# Patient Record
Sex: MALE | Race: BLACK OR AFRICAN AMERICAN | NOT HISPANIC OR LATINO | URBAN - METROPOLITAN AREA
[De-identification: names, ages, dates, MRNs, and addresses within clinical notes are randomized per-mention and may not be internally consistent; named-entity substitution may affect disease eponyms.]

---

## 2019-09-25 ENCOUNTER — EMERGENCY (EMERGENCY)
Facility: HOSPITAL | Age: 44
LOS: 1 days | Discharge: HOME | End: 2019-09-25
Attending: STUDENT IN AN ORGANIZED HEALTH CARE EDUCATION/TRAINING PROGRAM
Payer: MEDICAID

## 2019-09-25 VITALS
OXYGEN SATURATION: 97 % | SYSTOLIC BLOOD PRESSURE: 141 MMHG | DIASTOLIC BLOOD PRESSURE: 89 MMHG | HEART RATE: 81 BPM | TEMPERATURE: 98 F | RESPIRATION RATE: 16 BRPM

## 2019-09-25 VITALS
OXYGEN SATURATION: 100 % | DIASTOLIC BLOOD PRESSURE: 99 MMHG | RESPIRATION RATE: 98 BRPM | SYSTOLIC BLOOD PRESSURE: 159 MMHG | HEART RATE: 18 BPM

## 2019-09-25 LAB
ALBUMIN SERPL ELPH-MCNC: 4.5 G/DL — SIGNIFICANT CHANGE UP (ref 3.3–5)
ALP SERPL-CCNC: 114 U/L — SIGNIFICANT CHANGE UP (ref 40–120)
ALT FLD-CCNC: 71 U/L — HIGH (ref 4–41)
ANION GAP SERPL CALC-SCNC: 16 MMO/L — HIGH (ref 7–14)
APPEARANCE UR: CLEAR — SIGNIFICANT CHANGE UP
AST SERPL-CCNC: 41 U/L — HIGH (ref 4–40)
BASOPHILS # BLD AUTO: 0.06 K/UL — SIGNIFICANT CHANGE UP (ref 0–0.2)
BASOPHILS NFR BLD AUTO: 0.7 % — SIGNIFICANT CHANGE UP (ref 0–2)
BILIRUB SERPL-MCNC: 0.5 MG/DL — SIGNIFICANT CHANGE UP (ref 0.2–1.2)
BILIRUB UR-MCNC: NEGATIVE — SIGNIFICANT CHANGE UP
BLOOD UR QL VISUAL: NEGATIVE — SIGNIFICANT CHANGE UP
BUN SERPL-MCNC: 15 MG/DL — SIGNIFICANT CHANGE UP (ref 7–23)
CALCIUM SERPL-MCNC: 9.3 MG/DL — SIGNIFICANT CHANGE UP (ref 8.4–10.5)
CHLORIDE SERPL-SCNC: 93 MMOL/L — LOW (ref 98–107)
CO2 SERPL-SCNC: 25 MMOL/L — SIGNIFICANT CHANGE UP (ref 22–31)
COLOR SPEC: YELLOW — SIGNIFICANT CHANGE UP
CREAT SERPL-MCNC: 1.01 MG/DL — SIGNIFICANT CHANGE UP (ref 0.5–1.3)
EOSINOPHIL # BLD AUTO: 0.09 K/UL — SIGNIFICANT CHANGE UP (ref 0–0.5)
EOSINOPHIL NFR BLD AUTO: 1 % — SIGNIFICANT CHANGE UP (ref 0–6)
GLUCOSE SERPL-MCNC: 395 MG/DL — HIGH (ref 70–99)
GLUCOSE UR-MCNC: >1000 — HIGH
HCT VFR BLD CALC: 52.4 % — HIGH (ref 39–50)
HGB BLD-MCNC: 16.9 G/DL — SIGNIFICANT CHANGE UP (ref 13–17)
IMM GRANULOCYTES NFR BLD AUTO: 0.3 % — SIGNIFICANT CHANGE UP (ref 0–1.5)
KETONES UR-MCNC: SIGNIFICANT CHANGE UP
LACTATE BLDV-MCNC: 1.9 MMOL/L — SIGNIFICANT CHANGE UP (ref 0.5–2)
LEUKOCYTE ESTERASE UR-ACNC: NEGATIVE — SIGNIFICANT CHANGE UP
LYMPHOCYTES # BLD AUTO: 2.63 K/UL — SIGNIFICANT CHANGE UP (ref 1–3.3)
LYMPHOCYTES # BLD AUTO: 29.6 % — SIGNIFICANT CHANGE UP (ref 13–44)
MCHC RBC-ENTMCNC: 26.8 PG — LOW (ref 27–34)
MCHC RBC-ENTMCNC: 32.3 % — SIGNIFICANT CHANGE UP (ref 32–36)
MCV RBC AUTO: 83.2 FL — SIGNIFICANT CHANGE UP (ref 80–100)
MONOCYTES # BLD AUTO: 0.74 K/UL — SIGNIFICANT CHANGE UP (ref 0–0.9)
MONOCYTES NFR BLD AUTO: 8.3 % — SIGNIFICANT CHANGE UP (ref 2–14)
NEUTROPHILS # BLD AUTO: 5.35 K/UL — SIGNIFICANT CHANGE UP (ref 1.8–7.4)
NEUTROPHILS NFR BLD AUTO: 60.1 % — SIGNIFICANT CHANGE UP (ref 43–77)
NITRITE UR-MCNC: NEGATIVE — SIGNIFICANT CHANGE UP
NRBC # FLD: 0 K/UL — SIGNIFICANT CHANGE UP (ref 0–0)
PH UR: 6 — SIGNIFICANT CHANGE UP (ref 5–8)
PLATELET # BLD AUTO: 252 K/UL — SIGNIFICANT CHANGE UP (ref 150–400)
PMV BLD: 10.1 FL — SIGNIFICANT CHANGE UP (ref 7–13)
POTASSIUM SERPL-MCNC: 3.8 MMOL/L — SIGNIFICANT CHANGE UP (ref 3.5–5.3)
POTASSIUM SERPL-SCNC: 3.8 MMOL/L — SIGNIFICANT CHANGE UP (ref 3.5–5.3)
PROT SERPL-MCNC: 7.9 G/DL — SIGNIFICANT CHANGE UP (ref 6–8.3)
PROT UR-MCNC: 10 — SIGNIFICANT CHANGE UP
RBC # BLD: 6.3 M/UL — HIGH (ref 4.2–5.8)
RBC # FLD: 14.3 % — SIGNIFICANT CHANGE UP (ref 10.3–14.5)
SODIUM SERPL-SCNC: 134 MMOL/L — LOW (ref 135–145)
SP GR SPEC: 1.04 — SIGNIFICANT CHANGE UP (ref 1–1.04)
UROBILINOGEN FLD QL: NORMAL — SIGNIFICANT CHANGE UP
WBC # BLD: 8.9 K/UL — SIGNIFICANT CHANGE UP (ref 3.8–10.5)
WBC # FLD AUTO: 8.9 K/UL — SIGNIFICANT CHANGE UP (ref 3.8–10.5)

## 2019-09-25 PROCEDURE — 99283 EMERGENCY DEPT VISIT LOW MDM: CPT

## 2019-09-25 RX ORDER — METFORMIN HYDROCHLORIDE 850 MG/1
1 TABLET ORAL
Qty: 14 | Refills: 0
Start: 2019-09-25 | End: 2019-10-08

## 2019-09-25 RX ORDER — SODIUM CHLORIDE 9 MG/ML
1000 INJECTION INTRAMUSCULAR; INTRAVENOUS; SUBCUTANEOUS ONCE
Refills: 0 | Status: COMPLETED | OUTPATIENT
Start: 2019-09-25 | End: 2019-09-25

## 2019-09-25 RX ADMIN — SODIUM CHLORIDE 1000 MILLILITER(S): 9 INJECTION INTRAMUSCULAR; INTRAVENOUS; SUBCUTANEOUS at 15:39

## 2019-09-25 RX ADMIN — SODIUM CHLORIDE 1000 MILLILITER(S): 9 INJECTION INTRAMUSCULAR; INTRAVENOUS; SUBCUTANEOUS at 16:39

## 2019-09-25 NOTE — ED PROVIDER NOTE - OBJECTIVE STATEMENT
44yM w/pmhx HTN, depression, anxiety sent to ED by PMD for new onset DM. Pt states he has been having polyuria and polydipsia for the past 2 years. States at his PMDs office today his blood sugar was in the 300s, he was given 8 units of insulin and sent to the ED as he had +1 ketones in his urine. Pt denies fever/chills, dysuria, abd pain, n/v/d, cp, sob, recent travel or illness, URI symptoms, cough or any other concerns.

## 2019-09-25 NOTE — ED PROVIDER NOTE - ATTENDING CONTRIBUTION TO CARE
44yM w/pmhx HTN, depression, anxiety sent to ED by PMD for new onset DM.  in triage, will check cbc/cmp/vbg, A1c, ua. If pt is not in DKA will have him f/u with his PMD.    KAIDEN Gray: I have personally performed a face to face bedside history and physical examination of this patient. I have discussed the history, examination, review of systems, assessment and plan of management with the resident. I have reviewed the electronic medical record and amended it to reflect my history, review of systems, physical exam, assessment and plan.

## 2019-09-25 NOTE — ED PROVIDER NOTE - PATIENT PORTAL LINK FT
You can access the FollowMyHealth Patient Portal offered by [facility] by registering at the following website: [email or web address]. By joining [employer]’s FollowMyHealth portal, you will also be able to view your health information using other applications (apps) compatible with our system.

## 2019-09-25 NOTE — ED PROVIDER NOTE - CARE PLAN
Principal Discharge DX:	Type 2 diabetes mellitus without complication, unspecified whether long term insulin use

## 2019-09-25 NOTE — ED PROVIDER NOTE - PROGRESS NOTE DETAILS
NAOMI Shi: Pts A1c is 12.3, offered CDU to see endocrine tomorrow and pt refused.  downtrending. Will d/c home with metformin, PMD/endocrine follow up

## 2019-09-25 NOTE — ED PROVIDER NOTE - NSFOLLOWUPINSTRUCTIONS_ED_ALL_ED_FT
Follow up with your primary care provider within 1 week  Follow up with an endocrine doctor within one week, clinic information listed above  Take Metformin 500mg (1 tablet) daily  Return to the ER with any worsening or concerning symptoms, weakness, nausea, vomiting, fever/chills or any other concerns.

## 2019-09-25 NOTE — ED ADULT TRIAGE NOTE - CHIEF COMPLAINT QUOTE
Pt sent in from PMD after having annual check up for new onset diabetes, pt blood sugar elevated and UA showed ketones in his urine, pt reports weakness over the past several weeks, no further symptoms, pt was given 8 units of insulin at clinic today, fs is remains elevated.

## 2019-09-26 LAB — SPECIMEN SOURCE: SIGNIFICANT CHANGE UP

## 2019-09-27 LAB — BACTERIA UR CULT: SIGNIFICANT CHANGE UP

## 2019-10-01 ENCOUNTER — OUTPATIENT (OUTPATIENT)
Dept: OUTPATIENT SERVICES | Facility: HOSPITAL | Age: 44
LOS: 1 days | End: 2019-10-01
Payer: MEDICAID

## 2019-10-01 PROCEDURE — G9001: CPT

## 2019-10-03 DIAGNOSIS — Z71.89 OTHER SPECIFIED COUNSELING: ICD-10-CM

## 2019-10-03 PROBLEM — I10 ESSENTIAL (PRIMARY) HYPERTENSION: Chronic | Status: ACTIVE | Noted: 2019-09-25

## 2021-01-31 ENCOUNTER — INPATIENT (INPATIENT)
Facility: HOSPITAL | Age: 46
LOS: 4 days | Discharge: HOME | DRG: 605 | End: 2021-02-05
Attending: GENERAL PRACTICE | Admitting: GENERAL PRACTICE
Payer: MEDICAID

## 2021-01-31 VITALS
HEART RATE: 120 BPM | OXYGEN SATURATION: 93 % | RESPIRATION RATE: 20 BRPM | WEIGHT: 300.05 LBS | HEIGHT: 72 IN | DIASTOLIC BLOOD PRESSURE: 87 MMHG | SYSTOLIC BLOOD PRESSURE: 146 MMHG
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
## 2021-01-31 DIAGNOSIS — E87.2 ACIDOSIS: ICD-10-CM

## 2021-01-31 DIAGNOSIS — I10 ESSENTIAL (PRIMARY) HYPERTENSION: ICD-10-CM

## 2021-01-31 DIAGNOSIS — E11.9 TYPE 2 DIABETES MELLITUS WITHOUT COMPLICATIONS: ICD-10-CM

## 2021-01-31 DIAGNOSIS — R04.0 EPISTAXIS: ICD-10-CM

## 2021-01-31 DIAGNOSIS — S06.0X9A CONCUSSION WITH LOSS OF CONSCIOUSNESS OF UNSPECIFIED DURATION, INITIAL ENCOUNTER: ICD-10-CM

## 2021-01-31 DIAGNOSIS — R00.0 TACHYCARDIA, UNSPECIFIED: ICD-10-CM

## 2021-01-31 DIAGNOSIS — S00.81XA ABRASION OF OTHER PART OF HEAD, INITIAL ENCOUNTER: ICD-10-CM

## 2021-01-31 DIAGNOSIS — F41.9 ANXIETY DISORDER, UNSPECIFIED: ICD-10-CM

## 2021-01-31 DIAGNOSIS — F10.920 ALCOHOL USE, UNSPECIFIED WITH INTOXICATION, UNCOMPLICATED: ICD-10-CM

## 2021-01-31 LAB
A1C WITH ESTIMATED AVERAGE GLUCOSE RESULT: 6.8 % — HIGH (ref 4–5.6)
ALBUMIN SERPL ELPH-MCNC: 4 G/DL — SIGNIFICANT CHANGE UP (ref 3.3–5)
ALBUMIN SERPL ELPH-MCNC: 4.2 G/DL — SIGNIFICANT CHANGE UP (ref 3.3–5)
ALP SERPL-CCNC: 111 U/L — SIGNIFICANT CHANGE UP (ref 40–120)
ALP SERPL-CCNC: 93 U/L — SIGNIFICANT CHANGE UP (ref 40–120)
ALT FLD-CCNC: 51 U/L — HIGH (ref 10–45)
ALT FLD-CCNC: 58 U/L — HIGH (ref 10–45)
AMPHET UR-MCNC: NEGATIVE — SIGNIFICANT CHANGE UP
ANION GAP SERPL CALC-SCNC: 16 MMOL/L — SIGNIFICANT CHANGE UP (ref 5–17)
ANION GAP SERPL CALC-SCNC: 16 MMOL/L — SIGNIFICANT CHANGE UP (ref 5–17)
APPEARANCE UR: CLEAR — SIGNIFICANT CHANGE UP
AST SERPL-CCNC: 29 U/L — SIGNIFICANT CHANGE UP (ref 10–40)
AST SERPL-CCNC: 39 U/L — SIGNIFICANT CHANGE UP (ref 10–40)
B-OH-BUTYR SERPL-SCNC: 0.1 MMOL/L — SIGNIFICANT CHANGE UP
BACTERIA # UR AUTO: NEGATIVE — SIGNIFICANT CHANGE UP
BARBITURATES UR SCN-MCNC: NEGATIVE — SIGNIFICANT CHANGE UP
BASE EXCESS BLDV CALC-SCNC: -4.8 MMOL/L — LOW (ref -2–2)
BASOPHILS # BLD AUTO: 0.07 K/UL — SIGNIFICANT CHANGE UP (ref 0–0.2)
BASOPHILS NFR BLD AUTO: 0.7 % — SIGNIFICANT CHANGE UP (ref 0–2)
BENZODIAZ UR-MCNC: NEGATIVE — SIGNIFICANT CHANGE UP
BILIRUB DIRECT SERPL-MCNC: 0.1 MG/DL — SIGNIFICANT CHANGE UP (ref 0–0.2)
BILIRUB INDIRECT FLD-MCNC: 0.3 MG/DL — SIGNIFICANT CHANGE UP (ref 0.2–1)
BILIRUB SERPL-MCNC: 0.2 MG/DL — SIGNIFICANT CHANGE UP (ref 0.2–1.2)
BILIRUB SERPL-MCNC: 0.4 MG/DL — SIGNIFICANT CHANGE UP (ref 0.2–1.2)
BILIRUB UR-MCNC: NEGATIVE — SIGNIFICANT CHANGE UP
BLD GP AB SCN SERPL QL: NEGATIVE — SIGNIFICANT CHANGE UP
BUN SERPL-MCNC: 19 MG/DL — SIGNIFICANT CHANGE UP (ref 7–23)
BUN SERPL-MCNC: 23 MG/DL — SIGNIFICANT CHANGE UP (ref 7–23)
CA-I SERPL-SCNC: 1.07 MMOL/L — LOW (ref 1.12–1.3)
CALCIUM SERPL-MCNC: 8 MG/DL — LOW (ref 8.4–10.5)
CALCIUM SERPL-MCNC: 8.4 MG/DL — SIGNIFICANT CHANGE UP (ref 8.4–10.5)
CHLORIDE BLDV-SCNC: 111 MMOL/L — HIGH (ref 96–108)
CHLORIDE SERPL-SCNC: 103 MMOL/L — SIGNIFICANT CHANGE UP (ref 96–108)
CHLORIDE SERPL-SCNC: 104 MMOL/L — SIGNIFICANT CHANGE UP (ref 96–108)
CO2 BLDV-SCNC: 21 MMOL/L — LOW (ref 22–30)
CO2 SERPL-SCNC: 20 MMOL/L — LOW (ref 22–31)
CO2 SERPL-SCNC: 20 MMOL/L — LOW (ref 22–31)
COCAINE METAB.OTHER UR-MCNC: NEGATIVE — SIGNIFICANT CHANGE UP
COLOR SPEC: SIGNIFICANT CHANGE UP
CREAT SERPL-MCNC: 0.94 MG/DL — SIGNIFICANT CHANGE UP (ref 0.5–1.3)
CREAT SERPL-MCNC: 1.46 MG/DL — HIGH (ref 0.5–1.3)
D DIMER BLD IA.RAPID-MCNC: 282 NG/ML DDU — HIGH
DIFF PNL FLD: NEGATIVE — SIGNIFICANT CHANGE UP
EOSINOPHIL # BLD AUTO: 0.09 K/UL — SIGNIFICANT CHANGE UP (ref 0–0.5)
EOSINOPHIL NFR BLD AUTO: 0.9 % — SIGNIFICANT CHANGE UP (ref 0–6)
EPI CELLS # UR: 0 /HPF — SIGNIFICANT CHANGE UP
ESTIMATED AVERAGE GLUCOSE: 148 MG/DL — HIGH (ref 68–114)
ETHANOL SERPL-MCNC: 311 MG/DL — HIGH (ref 0–10)
GAS PNL BLDV: 141 MMOL/L — SIGNIFICANT CHANGE UP (ref 135–145)
GAS PNL BLDV: SIGNIFICANT CHANGE UP
GAS PNL BLDV: SIGNIFICANT CHANGE UP
GLUCOSE BLDC GLUCOMTR-MCNC: 155 MG/DL — HIGH (ref 70–99)
GLUCOSE BLDC GLUCOMTR-MCNC: 166 MG/DL — HIGH (ref 70–99)
GLUCOSE BLDV-MCNC: 185 MG/DL — HIGH (ref 70–99)
GLUCOSE SERPL-MCNC: 172 MG/DL — HIGH (ref 70–99)
GLUCOSE SERPL-MCNC: 181 MG/DL — HIGH (ref 70–99)
GLUCOSE UR QL: ABNORMAL
HCO3 BLDV-SCNC: 20 MMOL/L — LOW (ref 21–29)
HCT VFR BLD CALC: 39.9 % — SIGNIFICANT CHANGE UP (ref 39–50)
HCT VFR BLD CALC: 41.6 % — SIGNIFICANT CHANGE UP (ref 39–50)
HCT VFR BLD CALC: 45.5 % — SIGNIFICANT CHANGE UP (ref 39–50)
HCT VFR BLDA CALC: 43 % — SIGNIFICANT CHANGE UP (ref 39–50)
HGB BLD CALC-MCNC: 13.9 G/DL — SIGNIFICANT CHANGE UP (ref 13–17)
HGB BLD-MCNC: 13.1 G/DL — SIGNIFICANT CHANGE UP (ref 13–17)
HGB BLD-MCNC: 13.8 G/DL — SIGNIFICANT CHANGE UP (ref 13–17)
HGB BLD-MCNC: 15.2 G/DL — SIGNIFICANT CHANGE UP (ref 13–17)
HIV 1 & 2 AB SERPL IA.RAPID: SIGNIFICANT CHANGE UP
HYALINE CASTS # UR AUTO: 0 /LPF — SIGNIFICANT CHANGE UP (ref 0–2)
IMM GRANULOCYTES NFR BLD AUTO: 0.4 % — SIGNIFICANT CHANGE UP (ref 0–1.5)
KETONES UR-MCNC: ABNORMAL
LACTATE BLDV-MCNC: 4.1 MMOL/L — CRITICAL HIGH (ref 0.7–2)
LACTATE SERPL-SCNC: 3.1 MMOL/L — HIGH (ref 0.7–2)
LEUKOCYTE ESTERASE UR-ACNC: NEGATIVE — SIGNIFICANT CHANGE UP
LYMPHOCYTES # BLD AUTO: 29.8 % — SIGNIFICANT CHANGE UP (ref 13–44)
LYMPHOCYTES # BLD AUTO: 3.08 K/UL — SIGNIFICANT CHANGE UP (ref 1–3.3)
MAGNESIUM SERPL-MCNC: 2 MG/DL — SIGNIFICANT CHANGE UP (ref 1.6–2.6)
MAGNESIUM SERPL-MCNC: 2.3 MG/DL — SIGNIFICANT CHANGE UP (ref 1.6–2.6)
MCHC RBC-ENTMCNC: 27.8 PG — SIGNIFICANT CHANGE UP (ref 27–34)
MCHC RBC-ENTMCNC: 28 PG — SIGNIFICANT CHANGE UP (ref 27–34)
MCHC RBC-ENTMCNC: 28 PG — SIGNIFICANT CHANGE UP (ref 27–34)
MCHC RBC-ENTMCNC: 32.8 GM/DL — SIGNIFICANT CHANGE UP (ref 32–36)
MCHC RBC-ENTMCNC: 33.2 GM/DL — SIGNIFICANT CHANGE UP (ref 32–36)
MCHC RBC-ENTMCNC: 33.4 GM/DL — SIGNIFICANT CHANGE UP (ref 32–36)
MCV RBC AUTO: 83.8 FL — SIGNIFICANT CHANGE UP (ref 80–100)
MCV RBC AUTO: 84.6 FL — SIGNIFICANT CHANGE UP (ref 80–100)
MCV RBC AUTO: 84.7 FL — SIGNIFICANT CHANGE UP (ref 80–100)
METHADONE UR-MCNC: NEGATIVE — SIGNIFICANT CHANGE UP
MONOCYTES # BLD AUTO: 0.94 K/UL — HIGH (ref 0–0.9)
MONOCYTES NFR BLD AUTO: 9.1 % — SIGNIFICANT CHANGE UP (ref 2–14)
NEUTROPHILS # BLD AUTO: 6.1 K/UL — SIGNIFICANT CHANGE UP (ref 1.8–7.4)
NEUTROPHILS NFR BLD AUTO: 59.1 % — SIGNIFICANT CHANGE UP (ref 43–77)
NITRITE UR-MCNC: NEGATIVE — SIGNIFICANT CHANGE UP
NRBC # BLD: 0 /100 WBCS — SIGNIFICANT CHANGE UP (ref 0–0)
NT-PROBNP SERPL-SCNC: 36 PG/ML — SIGNIFICANT CHANGE UP (ref 0–300)
OPIATES UR-MCNC: NEGATIVE — SIGNIFICANT CHANGE UP
OTHER CELLS CSF MANUAL: 16 ML/DL — LOW (ref 18–22)
OXYCODONE UR-MCNC: NEGATIVE — SIGNIFICANT CHANGE UP
PCO2 BLDV: 39 MMHG — SIGNIFICANT CHANGE UP (ref 35–50)
PCP SPEC-MCNC: SIGNIFICANT CHANGE UP
PCP UR-MCNC: NEGATIVE — SIGNIFICANT CHANGE UP
PH BLDV: 7.34 — LOW (ref 7.35–7.45)
PH UR: 5.5 — SIGNIFICANT CHANGE UP (ref 5–8)
PHOSPHATE SERPL-MCNC: 3 MG/DL — SIGNIFICANT CHANGE UP (ref 2.5–4.5)
PLATELET # BLD AUTO: 242 K/UL — SIGNIFICANT CHANGE UP (ref 150–400)
PLATELET # BLD AUTO: 269 K/UL — SIGNIFICANT CHANGE UP (ref 150–400)
PLATELET # BLD AUTO: 308 K/UL — SIGNIFICANT CHANGE UP (ref 150–400)
PO2 BLDV: 52 MMHG — HIGH (ref 25–45)
POTASSIUM BLDV-SCNC: 5.7 MMOL/L — HIGH (ref 3.5–5.3)
POTASSIUM SERPL-MCNC: 3.8 MMOL/L — SIGNIFICANT CHANGE UP (ref 3.5–5.3)
POTASSIUM SERPL-MCNC: 4.1 MMOL/L — SIGNIFICANT CHANGE UP (ref 3.5–5.3)
POTASSIUM SERPL-SCNC: 3.8 MMOL/L — SIGNIFICANT CHANGE UP (ref 3.5–5.3)
POTASSIUM SERPL-SCNC: 4.1 MMOL/L — SIGNIFICANT CHANGE UP (ref 3.5–5.3)
PROT SERPL-MCNC: 7 G/DL — SIGNIFICANT CHANGE UP (ref 6–8.3)
PROT SERPL-MCNC: 7.8 G/DL — SIGNIFICANT CHANGE UP (ref 6–8.3)
PROT UR-MCNC: ABNORMAL
RBC # BLD: 4.71 M/UL — SIGNIFICANT CHANGE UP (ref 4.2–5.8)
RBC # BLD: 4.92 M/UL — SIGNIFICANT CHANGE UP (ref 4.2–5.8)
RBC # BLD: 5.43 M/UL — SIGNIFICANT CHANGE UP (ref 4.2–5.8)
RBC # FLD: 14.6 % — HIGH (ref 10.3–14.5)
RBC CASTS # UR COMP ASSIST: 2 /HPF — SIGNIFICANT CHANGE UP (ref 0–4)
RH IG SCN BLD-IMP: POSITIVE — SIGNIFICANT CHANGE UP
SAO2 % BLDV: 84 % — SIGNIFICANT CHANGE UP (ref 67–88)
SARS-COV-2 IGG SERPL QL IA: NEGATIVE — SIGNIFICANT CHANGE UP
SARS-COV-2 IGM SERPL IA-ACNC: 0.08 INDEX — SIGNIFICANT CHANGE UP
SARS-COV-2 RNA SPEC QL NAA+PROBE: SIGNIFICANT CHANGE UP
SODIUM SERPL-SCNC: 139 MMOL/L — SIGNIFICANT CHANGE UP (ref 135–145)
SODIUM SERPL-SCNC: 140 MMOL/L — SIGNIFICANT CHANGE UP (ref 135–145)
SP GR SPEC: 1.02 — SIGNIFICANT CHANGE UP (ref 1.01–1.02)
THC UR QL: NEGATIVE — SIGNIFICANT CHANGE UP
TROPONIN T, HIGH SENSITIVITY RESULT: 6 NG/L — SIGNIFICANT CHANGE UP (ref 0–51)
TROPONIN T, HIGH SENSITIVITY RESULT: <6 NG/L — SIGNIFICANT CHANGE UP (ref 0–51)
TSH SERPL-MCNC: 2.31 UIU/ML — SIGNIFICANT CHANGE UP (ref 0.27–4.2)
UROBILINOGEN FLD QL: NEGATIVE — SIGNIFICANT CHANGE UP
WBC # BLD: 10.32 K/UL — SIGNIFICANT CHANGE UP (ref 3.8–10.5)
WBC # BLD: 11.53 K/UL — HIGH (ref 3.8–10.5)
WBC # BLD: 13.82 K/UL — HIGH (ref 3.8–10.5)
WBC # FLD AUTO: 10.32 K/UL — SIGNIFICANT CHANGE UP (ref 3.8–10.5)
WBC # FLD AUTO: 11.53 K/UL — HIGH (ref 3.8–10.5)
WBC # FLD AUTO: 13.82 K/UL — HIGH (ref 3.8–10.5)
WBC UR QL: 0 /HPF — SIGNIFICANT CHANGE UP (ref 0–5)

## 2021-01-31 PROCEDURE — 99285 EMERGENCY DEPT VISIT HI MDM: CPT

## 2021-01-31 PROCEDURE — 76377 3D RENDER W/INTRP POSTPROCES: CPT | Mod: 26

## 2021-01-31 PROCEDURE — 70486 CT MAXILLOFACIAL W/O DYE: CPT | Mod: 26,MA

## 2021-01-31 PROCEDURE — 71046 X-RAY EXAM CHEST 2 VIEWS: CPT | Mod: 26

## 2021-01-31 PROCEDURE — 70450 CT HEAD/BRAIN W/O DYE: CPT | Mod: 26,MA

## 2021-01-31 PROCEDURE — 93010 ELECTROCARDIOGRAM REPORT: CPT

## 2021-01-31 RX ORDER — SODIUM CHLORIDE 9 MG/ML
1000 INJECTION, SOLUTION INTRAVENOUS ONCE
Refills: 0 | Status: COMPLETED | OUTPATIENT
Start: 2021-01-31 | End: 2021-01-31

## 2021-01-31 RX ORDER — OXYCODONE HYDROCHLORIDE 5 MG/1
5 TABLET ORAL EVERY 4 HOURS
Refills: 0 | Status: DISCONTINUED | OUTPATIENT
Start: 2021-01-31 | End: 2021-02-05

## 2021-01-31 RX ORDER — INFLUENZA VIRUS VACCINE 15; 15; 15; 15 UG/.5ML; UG/.5ML; UG/.5ML; UG/.5ML
0.5 SUSPENSION INTRAMUSCULAR ONCE
Refills: 0 | Status: DISCONTINUED | OUTPATIENT
Start: 2021-01-31 | End: 2021-02-05

## 2021-01-31 RX ORDER — CIPROFLOXACIN LACTATE 400MG/40ML
500 VIAL (ML) INTRAVENOUS EVERY 12 HOURS
Refills: 0 | Status: DISCONTINUED | OUTPATIENT
Start: 2021-01-31 | End: 2021-02-03

## 2021-01-31 RX ORDER — DEXTROSE 50 % IN WATER 50 %
12.5 SYRINGE (ML) INTRAVENOUS ONCE
Refills: 0 | Status: DISCONTINUED | OUTPATIENT
Start: 2021-01-31 | End: 2021-02-05

## 2021-01-31 RX ORDER — SODIUM CHLORIDE 9 MG/ML
1000 INJECTION, SOLUTION INTRAVENOUS
Refills: 0 | Status: DISCONTINUED | OUTPATIENT
Start: 2021-01-31 | End: 2021-02-05

## 2021-01-31 RX ORDER — ACETAMINOPHEN 500 MG
650 TABLET ORAL EVERY 6 HOURS
Refills: 0 | Status: DISCONTINUED | OUTPATIENT
Start: 2021-01-31 | End: 2021-02-05

## 2021-01-31 RX ORDER — DEXTROSE 50 % IN WATER 50 %
15 SYRINGE (ML) INTRAVENOUS ONCE
Refills: 0 | Status: DISCONTINUED | OUTPATIENT
Start: 2021-01-31 | End: 2021-02-05

## 2021-01-31 RX ORDER — TETANUS TOXOID, REDUCED DIPHTHERIA TOXOID AND ACELLULAR PERTUSSIS VACCINE, ADSORBED 5; 2.5; 8; 8; 2.5 [IU]/.5ML; [IU]/.5ML; UG/.5ML; UG/.5ML; UG/.5ML
0.5 SUSPENSION INTRAMUSCULAR ONCE
Refills: 0 | Status: COMPLETED | OUTPATIENT
Start: 2021-01-31 | End: 2021-01-31

## 2021-01-31 RX ORDER — DEXTROSE 50 % IN WATER 50 %
25 SYRINGE (ML) INTRAVENOUS ONCE
Refills: 0 | Status: DISCONTINUED | OUTPATIENT
Start: 2021-01-31 | End: 2021-02-05

## 2021-01-31 RX ORDER — ACETAMINOPHEN 500 MG
975 TABLET ORAL ONCE
Refills: 0 | Status: COMPLETED | OUTPATIENT
Start: 2021-01-31 | End: 2021-01-31

## 2021-01-31 RX ORDER — SODIUM CHLORIDE 9 MG/ML
1000 INJECTION, SOLUTION INTRAVENOUS
Refills: 0 | Status: DISCONTINUED | OUTPATIENT
Start: 2021-01-31 | End: 2021-01-31

## 2021-01-31 RX ORDER — SODIUM CHLORIDE 9 MG/ML
1000 INJECTION INTRAMUSCULAR; INTRAVENOUS; SUBCUTANEOUS
Refills: 0 | Status: DISCONTINUED | OUTPATIENT
Start: 2021-01-31 | End: 2021-02-01

## 2021-01-31 RX ORDER — CLONAZEPAM 1 MG
1 TABLET ORAL
Qty: 0 | Refills: 0 | DISCHARGE

## 2021-01-31 RX ORDER — TRANEXAMIC ACID 100 MG/ML
5 INJECTION, SOLUTION INTRAVENOUS ONCE
Refills: 0 | Status: COMPLETED | OUTPATIENT
Start: 2021-01-31 | End: 2021-01-31

## 2021-01-31 RX ORDER — GLUCAGON INJECTION, SOLUTION 0.5 MG/.1ML
1 INJECTION, SOLUTION SUBCUTANEOUS ONCE
Refills: 0 | Status: DISCONTINUED | OUTPATIENT
Start: 2021-01-31 | End: 2021-02-05

## 2021-01-31 RX ORDER — PROPRANOLOL HCL 160 MG
1 CAPSULE, EXTENDED RELEASE 24HR ORAL
Qty: 0 | Refills: 0 | DISCHARGE

## 2021-01-31 RX ORDER — DULOXETINE HYDROCHLORIDE 30 MG/1
60 CAPSULE, DELAYED RELEASE ORAL DAILY
Refills: 0 | Status: DISCONTINUED | OUTPATIENT
Start: 2021-01-31 | End: 2021-02-05

## 2021-01-31 RX ORDER — INSULIN LISPRO 100/ML
VIAL (ML) SUBCUTANEOUS
Refills: 0 | Status: DISCONTINUED | OUTPATIENT
Start: 2021-01-31 | End: 2021-02-05

## 2021-01-31 RX ORDER — CLONAZEPAM 1 MG
1 TABLET ORAL DAILY
Refills: 0 | Status: DISCONTINUED | OUTPATIENT
Start: 2021-01-31 | End: 2021-02-05

## 2021-01-31 RX ADMIN — Medication 1 MILLIGRAM(S): at 23:03

## 2021-01-31 RX ADMIN — SODIUM CHLORIDE 150 MILLILITER(S): 9 INJECTION, SOLUTION INTRAVENOUS at 13:07

## 2021-01-31 RX ADMIN — Medication 975 MILLIGRAM(S): at 14:15

## 2021-01-31 RX ADMIN — Medication 1 TABLET(S): at 08:24

## 2021-01-31 RX ADMIN — Medication 2 MILLIGRAM(S): at 14:15

## 2021-01-31 RX ADMIN — SODIUM CHLORIDE 75 MILLILITER(S): 9 INJECTION INTRAMUSCULAR; INTRAVENOUS; SUBCUTANEOUS at 18:44

## 2021-01-31 RX ADMIN — TETANUS TOXOID, REDUCED DIPHTHERIA TOXOID AND ACELLULAR PERTUSSIS VACCINE, ADSORBED 0.5 MILLILITER(S): 5; 2.5; 8; 8; 2.5 SUSPENSION INTRAMUSCULAR at 08:14

## 2021-01-31 RX ADMIN — SODIUM CHLORIDE 2000 MILLILITER(S): 9 INJECTION, SOLUTION INTRAVENOUS at 08:15

## 2021-01-31 RX ADMIN — TRANEXAMIC ACID 5 MILLILITER(S): 100 INJECTION, SOLUTION INTRAVENOUS at 11:45

## 2021-01-31 RX ADMIN — Medication 975 MILLIGRAM(S): at 08:15

## 2021-01-31 RX ADMIN — SODIUM CHLORIDE 2000 MILLILITER(S): 9 INJECTION, SOLUTION INTRAVENOUS at 13:07

## 2021-01-31 NOTE — H&P ADULT - PROBLEM SELECTOR PLAN 2
ENT appreciated  nares packed  on abx per ENT  f/u and DC of nasal packing as able by ENT   monitor  pain mgmt

## 2021-01-31 NOTE — ED ADULT NURSE NOTE - INTERVENTIONS DEFINITIONS
Non-slip footwear when patient is off stretcher/Physically safe environment: no spills, clutter or unnecessary equipment/Stretcher in lowest position, wheels locked, appropriate side rails in place/Provide visual cue, wrist band, yellow gown, etc./Monitor gait and stability

## 2021-01-31 NOTE — ED PROVIDER NOTE - OBJECTIVE STATEMENT
Tiffanie Apple MD: 44yo M with PMH of IDDM, HTN who presents with facial injury s/p fall. Pt states he was drinking EtOH earlier today and was running to cross the street when he tripped and fell on face. Forehead wrapped with nose bleeding while in ED. Unknown LOC. States he had vomiting. Every day drinker of beer. No hx of withdrawal symptoms, seizures. No AC use. Unknown last tetanus.

## 2021-01-31 NOTE — ED ADULT NURSE REASSESSMENT NOTE - NS ED NURSE REASSESS COMMENT FT1
Patient resting in stretcher.  Rhino rocket in left nare and right nare started to bleed.  Patient awake and alert but left nare uncomfortable.  Dr. Apple aware and came to bedside to evaluate patient.

## 2021-01-31 NOTE — ED PROVIDER NOTE - PHYSICAL EXAMINATION
GENERAL: On backboard with C-collar in place.  SKIN: Warm and well perfused. No rashes, bruises, discolorations or abrasions.  HEAD: +L forehead irregular lac oozing blood with hematoma, normocephalic without edema, discoloration or evidence of trauma. Facial bones without deformities or tenderness.   EYES: PERRL. No scleral icterus or conjunctival injection. Extraocular muscles intact without nystagmus or diplopia. No proptosis or enophthalmos.  EARS: Normal appearing pinnae. No hemotympanum.  NOSE: No discharge, tenderness, laxity. No nasal septal hematoma. +Active bleeding L nostril   MOUTH: No malocclusion or trismus. Moist mucus membranes without blood. Posterior pharynx without erythema or exudate.  NECK: Trachea midline. No discolorations or edema.  CV: Regular rate and rhythm, Normal s1 and s2. No murmurs, rubs, or gallops.  PV: Radial pulses 2+ bilaterally and symmetric. Dorsalis pedis pulses 2+ bilaterally and symmetric. 2+ capillary refill. No extremity edema.  CHEST: No abrasions or ecchymosis. Chest symmetric with respirations. No chest wall tenderness. No crepitus. No step offs. Coarse lungs sounds diffusely  ABDOMEN: No ecchymosis or abrasions. Soft, nondistended, nontender. No masses or organomegaly.   BACK: No abrasions, skin openings, or ecchymosis. Spine without bony tenderness, no step offs.  PELVIC: Pelvis stable, nontender to lateral compression and palpation of symphysis pubis.  : Normal external genitalia without blood at meatus. No ecchymosis or edema.  MSK: No gross deformities. Abrasion to L palmar region, abrasion to R knee. Tolerates full range of motion of extremities without tenderness.   NEURO: Alert and oriented to person, place, and time. GCS 15. CN II-XII intact. Sensation grossly intact. Strength 5/5 in bilateral UE and LE. Finger to nose intact bilaterally.

## 2021-01-31 NOTE — PATIENT PROFILE ADULT - NSPROPOAURINARYCATHETER_GEN_A_NUR
Abdomen soft, non-tender and non-distended, no rebound, no guarding and no masses. no hepatosplenomegaly.
no

## 2021-01-31 NOTE — ED PROVIDER NOTE - NS ED ROS FT
General: denies fever, chills  HENT: denies sore throat, rhinorrhea, +nose bleed  Eyes: denies vision changes  CV: denies chest pain  Resp: denies difficulty breathing, cough  Abdominal: +vomiting, denies diarrhea, abdominal pain, blood in stool, dark stool  : denies pain with urination  MSK: denies recent trauma  Neuro: denies headaches, numbness, tingling, dizziness, lightheadedness.  Skin: +forehead wound, +L hand wound   Endocrine: denies recent weight loss

## 2021-01-31 NOTE — ED PROVIDER NOTE - PROGRESS NOTE DETAILS
Tiffanie Apple MD: L nostril rebleeding. Will temporize. SBP in the 90's, pt s/p 0.5 L NS. Will observe, reassess. Tiffanie Apple MD: Intermittent bleeding from L nostril now s/p TXA soaked gauze placement. Controlled. CT and XRs unremarkable. Will repair L forehead wound. 7.5cm rhinorocket placed in L nostril. Continued bleeding. Rhinorocket removed as pt was uncomfortable. ENT consulted. Tiffanie Apple MD: ENT placed L rhinorocket again and R absorbable packing. Bleeding controlled. Currently getting fluids. Will admit

## 2021-01-31 NOTE — ED ADULT NURSE REASSESSMENT NOTE - NS ED NURSE REASSESS COMMENT FT1
Patient said nose started bleeding again.  Pressure applied to nose and Dr. Apple made aware and came to bedside.  Patient given phenylephrine nasal spray by Dr. Apple and pressure maintained.  Bleeding stopped with pressure.  Patient awake and cooperative and agrees to plan.

## 2021-01-31 NOTE — ED ADULT NURSE NOTE - OBJECTIVE STATEMENT
45 year old male presents to the ED by EMS s/p unwitnessed fall.  He is a daily alcohol drinker and said he fell today, details are unclear.  Patient c/o left palm pain and multiple abrasions.  Patient denies: chest pain, shortness of breath, nausea, vomiting, vision changes.  Patient reports he sees a therapist for thoughts of SI.  Denies plan and no HI. 45 year old male presents to the ED by EMS with facial injuries and multiple abrasions s/p unwitnessed fall.  He is a daily alcohol drinker and reports drinking alcohol and said he fell today, while running across the street. Patient c/o left palm pain and multiple abrasions.  Patient denies: chest pain, shortness of breath, nausea, vomiting, vision changes.  Patient reports he sees a therapist for thoughts of SI.  Denies plan and no HI.

## 2021-01-31 NOTE — ED PROVIDER NOTE - CARE PLAN
Principal Discharge DX:	Closed head injury with concussion  Secondary Diagnosis:	Forehead abrasion, initial encounter  Secondary Diagnosis:	Alcohol intoxication  Secondary Diagnosis:	Noncompliance w/medication treatment due to intermit use of medication   Principal Discharge DX:	Closed head injury with concussion  Secondary Diagnosis:	Forehead abrasion, initial encounter  Secondary Diagnosis:	Alcohol intoxication  Secondary Diagnosis:	Noncompliance w/medication treatment due to intermit use of medication  Secondary Diagnosis:	Dehydration, mild  Secondary Diagnosis:	Epistaxis due to trauma

## 2021-01-31 NOTE — ED PROVIDER NOTE - ATTENDING CONTRIBUTION TO CARE
------------ATTENDING NOTE------------  pt brought to ED by EMS after falling, describes being intoxicated/drinking alcohol, was running and tripped/fell landed on face/hands, ?LOC, c/o abrasions/laceration to L forehead, L nasal bleed, complicated as dehydrated, noncompliant w/ DM treatments, awaiting labs/imaging and close reassessments -->  - Clinton Kimbrough MD   ---------------------------------------------- ------------ATTENDING NOTE------------  pt brought to ED by EMS after falling, describes being intoxicated/drinking alcohol, was running and tripped/fell landed on face/hands, ?LOC, c/o abrasions/laceration to L forehead, L nasal bleed, complicated as dehydrated, noncompliant w/ DM treatments, awaiting labs/imaging and close reassessments --> imaging wnl, 1PM intermittent bleeding from L nostril (ENT involved and packed as still bleeding after out initial interventions), mentating well, SBP 110s, still tachy in 110s, pt denies drinking daily / withdrawal symptoms, awaiting repeat labs, ENT coming back to bedside, will need admission for continued close reassessments / optimize medical management -->  - Clinton Kimbrough MD   ---------------------------------------------- ------------ATTENDING NOTE------------  pt brought to ED by EMS after falling, describes being intoxicated/drinking alcohol, was running and tripped/fell landed on face/hands, ?LOC, c/o abrasions/laceration to L forehead, L nasal bleed, complicated as dehydrated, noncompliant w/ DM treatments, awaiting labs/imaging and close reassessments --> imaging wnl, 1PM intermittent bleeding from L nostril (ENT involved and packed as still bleeding after out initial interventions), mentating well, SBP 110s, still tachy in 110s, pt denies drinking daily / withdrawal symptoms, awaiting repeat labs, ENT coming back to bedside, still benign chest/abdomen, will need admission for continued close reassessments / optimize medical management -->  - Clinton Kimbrough MD   ---------------------------------------------- ------------ATTENDING NOTE------------  pt brought to ED by EMS after falling, describes being intoxicated/drinking alcohol, was running and tripped/fell landed on face/hands, ?LOC, c/o abrasions/laceration to L forehead, L nasal bleed, complicated as dehydrated, noncompliant w/ DM treatments, awaiting labs/imaging and close reassessments --> imaging wnl, 1PM intermittent bleeding from L nostril (ENT involved and packed as still bleeding after out initial interventions), mentating well, SBP 110s, still tachy in 110s, pt denies drinking daily / withdrawal symptoms, awaiting repeat labs, ENT coming back to bedside, still benign chest/abdomen, will need admission for continued close reassessments / optimize medical management --> (13:45) bleeding stopped w/ ENT, pt very anxious/upset over nasal packing (will add benzo), continuing IVF, otherwise benign repeat exams -->  - Clinton Kimbrough MD   ----------------------------------------------

## 2021-01-31 NOTE — H&P ADULT - PROBLEM SELECTOR PLAN 1
trip and fall with injuries to face and extremities  local care  pain mgmt  eventual PT eval  monitor for neuro changes  ETOH cessation education

## 2021-01-31 NOTE — ED PROVIDER NOTE - SECONDARY DIAGNOSIS.
Noncompliance w/medication treatment due to intermit use of medication Forehead abrasion, initial encounter Alcohol intoxication Dehydration, mild Epistaxis due to trauma

## 2021-01-31 NOTE — ED ADULT NURSE NOTE - NSHOSCREENINGQ1_ED_ALL_ED
[facility]  AODA Intake Diagnosis and Plan      Name of Physician contacted: DR Favio Griffin    AODA Placement Criteria  Axis I Clinical Disorders = Abuse:    Axis I - Clinical Disorders = Dependence:    ICD 10 Diagnosis: Opioid Use Disorder-F11.20  Withdrawal Potential: LEVEL II.5: Partial Hospital   Biomedical Conditions & Complications: LEVEL II.5: Partial Hospital    Emotional, Behavioral or Cognitive Conditions & Complications:LEVEL II.5: Partial Hospital  Readiness to Change:  LEVEL II.5: Partial Hospital  Relapse, Continued Use or Continued Problem Potential Recovery Environment: LEVEL II.5: Partial Hospital  Recovery Environment:  LEVEL II.5 Partial Hospital    Physician Recommended Level of Care  Physician Recommended Level of Care: PHP  Placement Patient Agrees to: [facility], PHP  Patient Declined/AMA Signed:    Comments:          Ce Pichardo RN   8/1/2018 3:30 PM       No

## 2021-01-31 NOTE — ED ADULT NURSE REASSESSMENT NOTE - NS ED NURSE REASSESS COMMENT FT1
Patient sitting upright in stretcher with rhino rocket in left nare and right nare still has some intermittent bleeding.  Patient awake and alert and following commands and aware ENT to come back to see him.

## 2021-01-31 NOTE — ED ADULT NURSE REASSESSMENT NOTE - NS ED NURSE REASSESS COMMENT FT1
Patient resting in stretcher reports discomfort in the forehead and nose, no active bleeding at this time.  Bed in lowest position, non-skid socks on and call bell next to patient.

## 2021-01-31 NOTE — ED ADULT NURSE REASSESSMENT NOTE - NS ED NURSE REASSESS COMMENT FT1
Patient aware that he is being transferred to holding area and pending bed assignment.  Patient agreeable.  Patient's gown changed and cleaned.

## 2021-01-31 NOTE — ED PROVIDER NOTE - CLINICAL SUMMARY MEDICAL DECISION MAKING FREE TEXT BOX
see MD Note see MD Note    Tiffanie Apple MD: 46yo M with PMH of IDDM, HTN who presents with facial injury s/p fall and recent EtOH use. Vitals normal. GCS 15. Pt appears intoxicated. Active bleeding from L nostril, will temporize. L forehead hematoma will need to be cleaned and possibly repaired after CT imaging of head and maxface. Will get labs, CXR and pain control

## 2021-01-31 NOTE — H&P ADULT - PROBLEM SELECTOR PLAN 5
multifactorial given pain, nasal packing discomfort and ETOH   check D Dimer     if D Dimer normal: monitor on IVH /PO hydration    if elevated >> CTA  echo  tele

## 2021-01-31 NOTE — H&P ADULT - HISTORY OF PRESENT ILLNESS
>>>>>>Medical Attending Initial / Admission note<<<<<<  -------------------------------------------------------------------------------  CHIEF COMPLAINT & HISTORY OF PRESENT ILLNESS:      Pt is a 46 yo man with PMH of DM, HTN ( has been off all Meds, on diet !), depression,  anxiety, ETOH use, who presents with facial injury s/p fall.   Pt states he was drinking EtOH earlier today and was running to cross the street to catch a car, when he tripped and fell on hands and face.   pt with forehead laceration and significant nose bleed, hand and Rt nee abrasions ans skin tears,   pt is an every day drinker of beer and rum denies hx of withdrawal symptoms, seizures.   No AC use.   lac repaired in ED and ENT placed nasal rockets   pt with c/o pain in knee, hands, face, and left shoulder     --------------------------------------------------------------------------------  PAST MEDICAL HISTORY:    DM  HTN  Depression  Anxiety    --------------------------------------------------------------------------------  PAST SURGICAL HISTORY:    low back L5/S1 surgery     --------------------------------------------------------------------------------  FAMILY HISTORY:  no cancer of heart disease reported   otherwise none     --------------------------------------------------------------------------------  SOCIAL HISTORY:  Alcohol: as above , daily   Smoking: None reported     no other substances reported      --------------------------------------------------------------------------------  ALLERGIES:    [As windy, reviewed]    --------------------------------------------------------------------------------  MEDICATIONS:   [As wnidy, reviewed]    --------------------------------------------------------------------------------  REVIEW OF SYSTEM:    GEN: no fever, no chills, pain as above   RESP: mild SOB ( ? related to nasal packing) , no cough, no sputum  CVS: no chest pain, no palpitations, no edema  GI: no abdominal pain, no nausea, no vomiting, no constipation, no diarrhea  : no dysuria, no frequency, no hematuria  Neuro: no headache, no dizziness  PSYCH: no anxiety, no depression  Derm : no itching, no rash    --------------------------------------------------------------------------------  VITAL SIGNS:     Height (cm): 193  Weight (kg): 136.985  BMI (kg/m2): 36.8  Vital Signs Last 24 HrsT(C): 36.7 (01-31-21 @ 20:58)  T(F): 98 (01-31-21 @ 20:58), Max: 98 (01-31-21 @ 20:58)  HR: 120 (01-31-21 @ 20:58) (110 - 121)  BP: 146/85 (01-31-21 @ 20:58)  RR: 16 (01-31-21 @ 20:58) (12 - 20)  SpO2: 97% (01-31-21 @ 20:58) (93% - 97%)      POCT Blood Glucose.: 166 mg/dL (31 Jan 2021 18:39)  POCT Blood Glucose.: 155 mg/dL (31 Jan 2021 16:27)  POCT Blood Glucose.: 171 mg/dL (31 Jan 2021 07:57)    --------------------------------------------------------------------------------  PHYSICAL EXAM:    GEN: A&O X 3 , NAD , comfortable in bed  HEENT: PERRL, MMM, hearing intact, nasal packing in place, facial injury repaired, not bleeding   Neck: supple , no JVD  CVS: S1S2 , regular , tachy   PULM: CTA B/L,  limited exam due to body habitus.   ABD.: soft. non tender, non distended,  obese  Extrem: no edema ,  + injuries as noted above   Derm: No rash , no ecchymoses, as above   PSYCH : normal mood,  no delusion not anxious    --------------------------------------------------------------------------------  LAB AND IMAGING:   [As bellow, reviewed]    --------------------------------------------------------------------------------  ASSESSMENT AND PLAN:   [As bellow]    --------------------  Case discussed with pt, RN, NP..   ___________________________  H. JEANE London.  Pager: [phone]

## 2021-02-01 LAB
ALBUMIN SERPL ELPH-MCNC: 3.6 G/DL — SIGNIFICANT CHANGE UP (ref 3.3–5)
ALP SERPL-CCNC: 82 U/L — SIGNIFICANT CHANGE UP (ref 40–120)
ALT FLD-CCNC: 37 U/L — SIGNIFICANT CHANGE UP (ref 10–45)
ANION GAP SERPL CALC-SCNC: 11 MMOL/L — SIGNIFICANT CHANGE UP (ref 5–17)
AST SERPL-CCNC: 22 U/L — SIGNIFICANT CHANGE UP (ref 10–40)
BASOPHILS # BLD AUTO: 0.05 K/UL — SIGNIFICANT CHANGE UP (ref 0–0.2)
BASOPHILS NFR BLD AUTO: 0.4 % — SIGNIFICANT CHANGE UP (ref 0–2)
BILIRUB SERPL-MCNC: 0.5 MG/DL — SIGNIFICANT CHANGE UP (ref 0.2–1.2)
BUN SERPL-MCNC: 26 MG/DL — HIGH (ref 7–23)
CALCIUM SERPL-MCNC: 8.1 MG/DL — LOW (ref 8.4–10.5)
CHLORIDE SERPL-SCNC: 102 MMOL/L — SIGNIFICANT CHANGE UP (ref 96–108)
CHOLEST SERPL-MCNC: 221 MG/DL — HIGH
CO2 SERPL-SCNC: 24 MMOL/L — SIGNIFICANT CHANGE UP (ref 22–31)
CREAT SERPL-MCNC: 0.89 MG/DL — SIGNIFICANT CHANGE UP (ref 0.5–1.3)
EOSINOPHIL # BLD AUTO: 0.03 K/UL — SIGNIFICANT CHANGE UP (ref 0–0.5)
EOSINOPHIL NFR BLD AUTO: 0.3 % — SIGNIFICANT CHANGE UP (ref 0–6)
GLUCOSE BLDC GLUCOMTR-MCNC: 132 MG/DL — HIGH (ref 70–99)
GLUCOSE BLDC GLUCOMTR-MCNC: 160 MG/DL — HIGH (ref 70–99)
GLUCOSE BLDC GLUCOMTR-MCNC: 172 MG/DL — HIGH (ref 70–99)
GLUCOSE BLDC GLUCOMTR-MCNC: 228 MG/DL — HIGH (ref 70–99)
GLUCOSE BLDC GLUCOMTR-MCNC: 228 MG/DL — HIGH (ref 70–99)
GLUCOSE SERPL-MCNC: 161 MG/DL — HIGH (ref 70–99)
HCT VFR BLD CALC: 34.9 % — LOW (ref 39–50)
HDLC SERPL-MCNC: 36 MG/DL — LOW
HGB BLD-MCNC: 11.4 G/DL — LOW (ref 13–17)
IMM GRANULOCYTES NFR BLD AUTO: 0.4 % — SIGNIFICANT CHANGE UP (ref 0–1.5)
LACTATE SERPL-SCNC: 1.5 MMOL/L — SIGNIFICANT CHANGE UP (ref 0.7–2)
LIPID PNL WITH DIRECT LDL SERPL: 148 MG/DL — HIGH
LYMPHOCYTES # BLD AUTO: 2.53 K/UL — SIGNIFICANT CHANGE UP (ref 1–3.3)
LYMPHOCYTES # BLD AUTO: 21.9 % — SIGNIFICANT CHANGE UP (ref 13–44)
MAGNESIUM SERPL-MCNC: 1.9 MG/DL — SIGNIFICANT CHANGE UP (ref 1.6–2.6)
MCHC RBC-ENTMCNC: 27.7 PG — SIGNIFICANT CHANGE UP (ref 27–34)
MCHC RBC-ENTMCNC: 32.7 GM/DL — SIGNIFICANT CHANGE UP (ref 32–36)
MCV RBC AUTO: 84.9 FL — SIGNIFICANT CHANGE UP (ref 80–100)
MONOCYTES # BLD AUTO: 1.31 K/UL — HIGH (ref 0–0.9)
MONOCYTES NFR BLD AUTO: 11.4 % — SIGNIFICANT CHANGE UP (ref 2–14)
NEUTROPHILS # BLD AUTO: 7.57 K/UL — HIGH (ref 1.8–7.4)
NEUTROPHILS NFR BLD AUTO: 65.6 % — SIGNIFICANT CHANGE UP (ref 43–77)
NON HDL CHOLESTEROL: 184 MG/DL — HIGH
NRBC # BLD: 0 /100 WBCS — SIGNIFICANT CHANGE UP (ref 0–0)
PLATELET # BLD AUTO: 229 K/UL — SIGNIFICANT CHANGE UP (ref 150–400)
POTASSIUM SERPL-MCNC: 3.6 MMOL/L — SIGNIFICANT CHANGE UP (ref 3.5–5.3)
POTASSIUM SERPL-SCNC: 3.6 MMOL/L — SIGNIFICANT CHANGE UP (ref 3.5–5.3)
PROT SERPL-MCNC: 6.1 G/DL — SIGNIFICANT CHANGE UP (ref 6–8.3)
RBC # BLD: 4.11 M/UL — LOW (ref 4.2–5.8)
RBC # FLD: 14.9 % — HIGH (ref 10.3–14.5)
SODIUM SERPL-SCNC: 137 MMOL/L — SIGNIFICANT CHANGE UP (ref 135–145)
TRIGL SERPL-MCNC: 181 MG/DL — HIGH
WBC # BLD: 11.54 K/UL — HIGH (ref 3.8–10.5)
WBC # FLD AUTO: 11.54 K/UL — HIGH (ref 3.8–10.5)

## 2021-02-01 PROCEDURE — 71275 CT ANGIOGRAPHY CHEST: CPT | Mod: 26

## 2021-02-01 PROCEDURE — 73030 X-RAY EXAM OF SHOULDER: CPT | Mod: 26,LT

## 2021-02-01 RX ORDER — ACETAMINOPHEN 500 MG
975 TABLET ORAL ONCE
Refills: 0 | Status: COMPLETED | OUTPATIENT
Start: 2021-02-01 | End: 2021-02-01

## 2021-02-01 RX ORDER — SODIUM CHLORIDE 0.65 %
1 AEROSOL, SPRAY (ML) NASAL
Refills: 0 | Status: DISCONTINUED | OUTPATIENT
Start: 2021-02-01 | End: 2021-02-05

## 2021-02-01 RX ORDER — KETOROLAC TROMETHAMINE 30 MG/ML
30 SYRINGE (ML) INJECTION ONCE
Refills: 0 | Status: DISCONTINUED | OUTPATIENT
Start: 2021-02-01 | End: 2021-02-01

## 2021-02-01 RX ORDER — MORPHINE SULFATE 50 MG/1
2 CAPSULE, EXTENDED RELEASE ORAL ONCE
Refills: 0 | Status: DISCONTINUED | OUTPATIENT
Start: 2021-02-01 | End: 2021-02-01

## 2021-02-01 RX ADMIN — Medication 2 MILLIGRAM(S): at 15:15

## 2021-02-01 RX ADMIN — Medication 10 MILLIGRAM(S): at 12:45

## 2021-02-01 RX ADMIN — Medication 10 MILLIGRAM(S): at 18:19

## 2021-02-01 RX ADMIN — SODIUM CHLORIDE 75 MILLILITER(S): 9 INJECTION INTRAMUSCULAR; INTRAVENOUS; SUBCUTANEOUS at 04:41

## 2021-02-01 RX ADMIN — Medication 10 MILLIGRAM(S): at 01:20

## 2021-02-01 RX ADMIN — OXYCODONE HYDROCHLORIDE 5 MILLIGRAM(S): 5 TABLET ORAL at 00:43

## 2021-02-01 RX ADMIN — Medication 1 MILLIGRAM(S): at 12:45

## 2021-02-01 RX ADMIN — Medication 2 MILLIGRAM(S): at 04:39

## 2021-02-01 RX ADMIN — Medication 500 MILLIGRAM(S): at 04:40

## 2021-02-01 RX ADMIN — Medication 1 TABLET(S): at 12:45

## 2021-02-01 RX ADMIN — MORPHINE SULFATE 2 MILLIGRAM(S): 50 CAPSULE, EXTENDED RELEASE ORAL at 18:17

## 2021-02-01 RX ADMIN — OXYCODONE HYDROCHLORIDE 5 MILLIGRAM(S): 5 TABLET ORAL at 15:29

## 2021-02-01 RX ADMIN — Medication 1: at 08:42

## 2021-02-01 RX ADMIN — Medication 975 MILLIGRAM(S): at 04:40

## 2021-02-01 RX ADMIN — DULOXETINE HYDROCHLORIDE 60 MILLIGRAM(S): 30 CAPSULE, DELAYED RELEASE ORAL at 12:45

## 2021-02-01 RX ADMIN — Medication 650 MILLIGRAM(S): at 12:48

## 2021-02-01 RX ADMIN — Medication 1 SPRAY(S): at 18:19

## 2021-02-01 RX ADMIN — Medication 2: at 12:46

## 2021-02-01 RX ADMIN — Medication 2 MILLIGRAM(S): at 08:43

## 2021-02-01 RX ADMIN — Medication 500 MILLIGRAM(S): at 18:18

## 2021-02-01 NOTE — PROGRESS NOTE ADULT - SUBJECTIVE AND OBJECTIVE BOX
ENT ISSUE/POD: traumatic epistaxis    HPI: 44yo M with PMH of IDDM, HTN who presents with facial injury s/p fall, sustained L epistaxis despite denying hitting nose directly. Pt since packed with rapid rhino with no further bleeding. On ciprofloxacin as ppx      PAST MEDICAL & SURGICAL HISTORY:  DM (diabetes mellitus)    HTN (hypertension)    No significant past surgical history      Allergies    penicillin (Unknown)    Intolerances      MEDICATIONS  (STANDING):  busPIRone 10 milliGRAM(s) Oral three times a day  ciprofloxacin     Tablet 500 milliGRAM(s) Oral every 12 hours  clonazePAM  Tablet 1 milliGRAM(s) Oral daily  dextrose 40% Gel 15 Gram(s) Oral once  dextrose 5%. 1000 milliLiter(s) (50 mL/Hr) IV Continuous <Continuous>  dextrose 5%. 1000 milliLiter(s) (100 mL/Hr) IV Continuous <Continuous>  dextrose 50% Injectable 25 Gram(s) IV Push once  dextrose 50% Injectable 12.5 Gram(s) IV Push once  dextrose 50% Injectable 25 Gram(s) IV Push once  DULoxetine 60 milliGRAM(s) Oral daily  glucagon  Injectable 1 milliGRAM(s) IntraMuscular once  influenza   Vaccine 0.5 milliLiter(s) IntraMuscular once  insulin lispro (ADMELOG) corrective regimen sliding scale   SubCutaneous three times a day before meals  multivitamin 1 Tablet(s) Oral daily  propranolol 60 milliGRAM(s) Oral daily  sodium chloride 0.9%. 1000 milliLiter(s) (75 mL/Hr) IV Continuous <Continuous>    MEDICATIONS  (PRN):  acetaminophen   Tablet .. 650 milliGRAM(s) Oral every 6 hours PRN Mild Pain (1 - 3)  LORazepam   Injectable 2 milliGRAM(s) IV Push every 2 hours PRN CIWA-Ar score increase by 2 points and a total score of 7 or less  oxyCODONE    IR 5 milliGRAM(s) Oral every 4 hours PRN Moderate Pain (4 - 6)    ROS:   ENT: all negative except as noted in HPI   Pulm: denies SOB, cough, hemoptysis  Neuro: denies numbness/tingling, loss of sensation  Endo: denies heat/cold intolerance, excessive sweating      Vital Signs Last 24 Hrs  T(C): 36.5 (01 Feb 2021 08:37), Max: 36.7 (31 Jan 2021 20:58)  T(F): 97.7 (01 Feb 2021 08:37), Max: 98 (31 Jan 2021 20:58)  HR: 88 (01 Feb 2021 08:37) (88 - 130)  BP: 144/91 (01 Feb 2021 08:37) (107/77 - 157/81)  BP(mean): --  RR: 18 (01 Feb 2021 08:37) (12 - 19)  SpO2: 96% (01 Feb 2021 08:37) (93% - 97%)                          11.4   11.54 )-----------( 229      ( 01 Feb 2021 06:50 )             34.9    02-01    137  |  102  |  26<H>  ----------------------------<  161<H>  3.6   |  24  |  0.89    Ca    8.1<L>      01 Feb 2021 06:48  Phos  3.0     01-31  Mg     1.9     02-01    TPro  6.1  /  Alb  3.6  /  TBili  0.5  /  DBili  x   /  AST  22  /  ALT  37  /  AlkPhos  82  02-01       PHYSICAL EXAM:  Gen: NAD  Skin: No rashes, bruises, or lesions  Head: Normocephalic, Atraumatic  Face: no edema, erythema, or fluctuance. Parotid glands soft without mass  Eyes: no scleral injection  Nose: L rapid rhino in place, inflated, no active bleeding, R nare with nasopore packing, no bleeding  Mouth: No Stridor / Drooling / Trismus.  Mucosa moist, tongue/uvula midline, oropharynx clear of active bleeding or clots  Neck: Flat, supple, no lymphadenopathy, trachea midline, no masses  Lymphatic: No lymphadenopathy  Resp: breathing easily, no stridor  Neuro: facial nerve intact, no facial droop

## 2021-02-01 NOTE — CHART NOTE - NSCHARTNOTEFT_GEN_A_CORE
Wound Care Team Note:    Request for wound care consult for facial wound received. Attempted to see patient but he was off the unit at a testing. Will reattempt.    Gissel Ramos, CYN-C, CWOCN [id number]

## 2021-02-02 LAB
ANION GAP SERPL CALC-SCNC: 13 MMOL/L — SIGNIFICANT CHANGE UP (ref 5–17)
BUN SERPL-MCNC: 18 MG/DL — SIGNIFICANT CHANGE UP (ref 7–23)
CALCIUM SERPL-MCNC: 8.5 MG/DL — SIGNIFICANT CHANGE UP (ref 8.4–10.5)
CHLORIDE SERPL-SCNC: 105 MMOL/L — SIGNIFICANT CHANGE UP (ref 96–108)
CO2 SERPL-SCNC: 24 MMOL/L — SIGNIFICANT CHANGE UP (ref 22–31)
CREAT SERPL-MCNC: 1.05 MG/DL — SIGNIFICANT CHANGE UP (ref 0.5–1.3)
GLUCOSE BLDC GLUCOMTR-MCNC: 151 MG/DL — HIGH (ref 70–99)
GLUCOSE BLDC GLUCOMTR-MCNC: 166 MG/DL — HIGH (ref 70–99)
GLUCOSE BLDC GLUCOMTR-MCNC: 172 MG/DL — HIGH (ref 70–99)
GLUCOSE BLDC GLUCOMTR-MCNC: 210 MG/DL — HIGH (ref 70–99)
GLUCOSE SERPL-MCNC: 131 MG/DL — HIGH (ref 70–99)
HCT VFR BLD CALC: 35.3 % — LOW (ref 39–50)
HGB BLD-MCNC: 11.5 G/DL — LOW (ref 13–17)
MAGNESIUM SERPL-MCNC: 2.1 MG/DL — SIGNIFICANT CHANGE UP (ref 1.6–2.6)
MCHC RBC-ENTMCNC: 28 PG — SIGNIFICANT CHANGE UP (ref 27–34)
MCHC RBC-ENTMCNC: 32.6 GM/DL — SIGNIFICANT CHANGE UP (ref 32–36)
MCV RBC AUTO: 86.1 FL — SIGNIFICANT CHANGE UP (ref 80–100)
NRBC # BLD: 0 /100 WBCS — SIGNIFICANT CHANGE UP (ref 0–0)
PLATELET # BLD AUTO: 219 K/UL — SIGNIFICANT CHANGE UP (ref 150–400)
POTASSIUM SERPL-MCNC: 4 MMOL/L — SIGNIFICANT CHANGE UP (ref 3.5–5.3)
POTASSIUM SERPL-SCNC: 4 MMOL/L — SIGNIFICANT CHANGE UP (ref 3.5–5.3)
RBC # BLD: 4.1 M/UL — LOW (ref 4.2–5.8)
RBC # FLD: 14.8 % — HIGH (ref 10.3–14.5)
SODIUM SERPL-SCNC: 142 MMOL/L — SIGNIFICANT CHANGE UP (ref 135–145)
WBC # BLD: 10.05 K/UL — SIGNIFICANT CHANGE UP (ref 3.8–10.5)
WBC # FLD AUTO: 10.05 K/UL — SIGNIFICANT CHANGE UP (ref 3.8–10.5)

## 2021-02-02 PROCEDURE — 99232 SBSQ HOSP IP/OBS MODERATE 35: CPT

## 2021-02-02 PROCEDURE — 99222 1ST HOSP IP/OBS MODERATE 55: CPT

## 2021-02-02 RX ADMIN — Medication 10 MILLIGRAM(S): at 18:39

## 2021-02-02 RX ADMIN — Medication 1 SPRAY(S): at 05:16

## 2021-02-02 RX ADMIN — OXYCODONE HYDROCHLORIDE 5 MILLIGRAM(S): 5 TABLET ORAL at 20:18

## 2021-02-02 RX ADMIN — Medication 1 TABLET(S): at 13:07

## 2021-02-02 RX ADMIN — Medication 1 MILLIGRAM(S): at 13:09

## 2021-02-02 RX ADMIN — Medication 1: at 18:39

## 2021-02-02 RX ADMIN — Medication 10 MILLIGRAM(S): at 01:07

## 2021-02-02 RX ADMIN — OXYCODONE HYDROCHLORIDE 5 MILLIGRAM(S): 5 TABLET ORAL at 09:15

## 2021-02-02 RX ADMIN — Medication 1 SPRAY(S): at 18:39

## 2021-02-02 RX ADMIN — Medication 1 SPRAY(S): at 13:10

## 2021-02-02 RX ADMIN — Medication 10 MILLIGRAM(S): at 09:16

## 2021-02-02 RX ADMIN — Medication 500 MILLIGRAM(S): at 18:39

## 2021-02-02 RX ADMIN — Medication 650 MILLIGRAM(S): at 13:08

## 2021-02-02 RX ADMIN — Medication 10 MILLIGRAM(S): at 23:34

## 2021-02-02 RX ADMIN — Medication 1 SPRAY(S): at 23:36

## 2021-02-02 RX ADMIN — Medication 650 MILLIGRAM(S): at 05:15

## 2021-02-02 RX ADMIN — DULOXETINE HYDROCHLORIDE 60 MILLIGRAM(S): 30 CAPSULE, DELAYED RELEASE ORAL at 13:07

## 2021-02-02 RX ADMIN — OXYCODONE HYDROCHLORIDE 5 MILLIGRAM(S): 5 TABLET ORAL at 01:07

## 2021-02-02 RX ADMIN — Medication 1 SPRAY(S): at 01:03

## 2021-02-02 RX ADMIN — Medication 2: at 13:07

## 2021-02-02 RX ADMIN — Medication 500 MILLIGRAM(S): at 05:15

## 2021-02-02 RX ADMIN — Medication 1: at 09:16

## 2021-02-02 NOTE — PHYSICAL THERAPY INITIAL EVALUATION ADULT - PRECAUTIONS/LIMITATIONS, REHAB EVAL
fall precautions 44yo M with PMH of IDDM, HTN who presents with facial injury s/p fall. Pt states he was drinking EtOH earlier today and was running to cross the street when he tripped and fell on face. Forehead wrapped with nose bleeding while in ED. Unknown LOC. Every day drinker of beer. No hx of withdrawal symptoms, seizures. No AC use. Pt denies hitting his nose during the fall. No recent sinusitis. BP well controlled; COVID 19 (not detected ) 1/31/21 and antibody (-) ; A1c 6.8/fall precautions

## 2021-02-02 NOTE — CONSULT NOTE ADULT - SUBJECTIVE AND OBJECTIVE BOX
Wound Surgery Consult Note:    Pt is a 46 yo man with PMH of DM, HTN ( has been off all Meds, on diet !), depression,  anxiety, ETOH use, who presents with facial injury s/p fall.   Pt states he was drinking EtOH earlier today and was running to cross the street to catch a car, when he tripped and fell on hands and face.   pt with forehead laceration and significant nose bleed, hand and Rt nee abrasions ans skin tears,   pt is an every day drinker of beer and rum denies hx of withdrawal symptoms, seizures.   No AC use.   lac repaired in ED and ENT placed nasal rockets   pt with c/o pain in knee, hands, face, and left shoulder     Request for wound care consult for facial abrasions received. Mr. Finley was encountered on an alternating air with low air loss surface resting comfortably. Facial abrasions cleansed and dressed without complaint.    PAST MEDICAL & SURGICAL HISTORY:  DM (diabetes mellitus)  HTN (hypertension)  No significant past surgical history    REVIEW OF SYSTEMS  General:	no fever or chills  ENMT:	nose bleed  Respiratory and Thorax: no SOB or cough  Cardiovascular:	no CP  Gastrointestinal:	 no n/v  Genitourinary:	no dysuria  Musculoskeletal:	 ambulatory  Neurological:	no LOC  Psychiatric:	daily ETOH  Endocrine:	DM  Skin: no chronic wounds    MEDICATIONS  (STANDING):  busPIRone 10 milliGRAM(s) Oral three times a day  ciprofloxacin     Tablet 500 milliGRAM(s) Oral every 12 hours  clonazePAM  Tablet 1 milliGRAM(s) Oral daily  dextrose 40% Gel 15 Gram(s) Oral once  dextrose 5%. 1000 milliLiter(s) (50 mL/Hr) IV Continuous <Continuous>  dextrose 5%. 1000 milliLiter(s) (100 mL/Hr) IV Continuous <Continuous>  dextrose 50% Injectable 25 Gram(s) IV Push once  dextrose 50% Injectable 12.5 Gram(s) IV Push once  dextrose 50% Injectable 25 Gram(s) IV Push once  DULoxetine 60 milliGRAM(s) Oral daily  glucagon  Injectable 1 milliGRAM(s) IntraMuscular once  influenza   Vaccine 0.5 milliLiter(s) IntraMuscular once  insulin lispro (ADMELOG) corrective regimen sliding scale   SubCutaneous three times a day before meals  multivitamin 1 Tablet(s) Oral daily  propranolol 60 milliGRAM(s) Oral daily  sodium chloride 0.65% Nasal 1 Spray(s) Both Nostrils four times a day    MEDICATIONS  (PRN):  acetaminophen   Tablet .. 650 milliGRAM(s) Oral every 6 hours PRN Mild Pain (1 - 3)  LORazepam   Injectable 2 milliGRAM(s) IV Push every 2 hours PRN CIWA-Ar score increase by 2 points and a total score of 7 or less  oxyCODONE    IR 5 milliGRAM(s) Oral every 4 hours PRN Moderate Pain (4 - 6)    Allergies    penicillin (Unknown)    Intolerances    SOCIAL HISTORY:  daily ETOH    FAMILY HISTORY: non contributory    Vital Signs Last 24 Hrs  T(C): 36.9 (2021 11:40), Max: 37.5 (2021 05:13)  T(F): 98.5 (2021 11:40), Max: 99.5 (2021 05:13)  HR: 94 (2021 11:40) (88 - 112)  BP: 143/95 (2021 11:40) (139/92 - 163/113)  BP(mean): --  RR: 19 (2021 11:40) (17 - 19)  SpO2: 94% (2021 11:40) (94% - 99%)    Physical Exam:  General: A&Ox3, Obese  ENMT: nasal rocket in Left nare  Respiratory: no SOB on room air  Gastrointestinal: soft NT/ND  Neurology: weakened strength & sensation grossly intact  Musculoskeletal: no contractures  Vascular: BLE edema equal, BLE equally warm  Skin:  Left forehead abrasion L 5cm x W 5cm x 0.1cm,, dry blood and drainage adherent to surface, some debris on wound surface remains, scant serosanguinous drainage, No odor, erythema, increased warmth, tenderness, induration, fluctuance    LABS:      142  |  105  |  18  ----------------------------<  131<H>  4.0   |  24  |  1.05    Ca    8.5      2021 05:49  Phos  3.0     01-31  Mg     2.1         TPro  6.1  /  Alb  3.6  /  TBili  0.5  /  DBili  x   /  AST  22  /  ALT  37  /  AlkPhos  82                            11.5   10.05 )-----------( 219      ( 2021 05:49 )             35.3       Urinalysis Basic - ( 2021 21:50 )    Color: Light Yellow / Appearance: Clear / S.024 / pH: x  Gluc: x / Ketone: Moderate  / Bili: Negative / Urobili: Negative   Blood: x / Protein: Trace / Nitrite: Negative   Leuk Esterase: Negative / RBC: 2 /hpf / WBC 0 /HPF   Sq Epi: x / Non Sq Epi: 0 /hpf / Bacteria: Negative        RADIOLOGY & ADDITIONAL STUDIES:    EXAM:  CT BRAIN                        EXAM:  CT MAXILLOFACIAL                        EXAM:  CT 3D RECONSTRUCT W FEMI                        PROCEDURE DATE:  2021    INTERPRETATION:  CLINICAL INFORMATION: Trauma, status post mechanical fall onto face with left for head hematoma and active bleeding from left nostril, alcohol intoxication  TECHNIQUE: Noncontrast CT scan of the head and maxillofacial bones was performed. The head CT portion was performed with 5 mm axial images. The maxillofacial CT was performed with thin section axial images with sagittal and coronal reformations. 3-D reconstruction of the CT maxillofacial bones was also performed.  COMPARISON: No similar prior studies available for comparison.  HEAD:  There is no acute intracranial hemorrhage or mass effect. There is no CT evidence of acute territorial infarct.  Gray-white matter differentiation is preserved.  The ventricles and sulci are normal in size.  The calvarium is intact.  There is mild mucosal thickening of the left maxillary sinus. The remaining visualized portions of the paranasal sinuses and mastoid air cells are clear.  MAXILLOFACIAL:  There is soft tissue swelling with laceration and small foci of air noted along the left frontal scalp. Multiple tiny radiopaque foreign bodies are seen within the laceration.  Swelling seen about the anterior aspect of the nasal bone.  The globes are symmetric in size and contour. Extraocular muscles are not enlarged or deviated. No radiopaque orbital foreign bodies are visualized. The retrobulbar fat is preserved.  There are no acute facial bone fractures.  The temporomandibular joints are intact.  IMPRESSION:  Head CT: No evidence for intracranial hemorrhage, mass effect, or displaced calvarial fracture.  Maxillofacial CT: No acute maxillofacial bone fracture. Left frontal scalp laceration with soft tissue swelling along the anterior aspect of the nasal bone. Multiple tiny radiopaque foreign bodies are seen within the laceration.  Swelling seen about the anterior aspect of the nasal bone.          
CC: epistaxis S/P fall    HPI: 46yo M with PMH of IDDM, HTN who presents with facial injury s/p fall. Pt states he was drinking EtOH earlier today and was running to cross the street when he tripped and fell on face. Forehead wrapped with nose bleeding while in ED. Unknown LOC. Every day drinker of beer. No hx of withdrawal symptoms, seizures. No AC use. Pt denies hitting his nose during the fall. No recent sinusitis. BP well controlled.        PAST MEDICAL & SURGICAL HISTORY:  DM (diabetes mellitus)    HTN (hypertension)    No significant past surgical history      Allergies    penicillin (Unknown)    Intolerances      MEDICATIONS  (STANDING):  influenza   Vaccine 0.5 milliLiter(s) IntraMuscular once    MEDICATIONS  (PRN):      Social History: ETOH, no tobacco use    Family history: No pertinent FHx    ROS:   ENT: all negative except as noted in HPI   CV: denies palpitations  Pulm: denies SOB, cough, hemoptysis  GI: denies change in apetite, indigestion, n/v  : denies pertinent urinary symptoms, urgency  Neuro: denies numbness/tingling, loss of sensation  Psych: denies anxiety  MS: denies muscle weakness, instability  Heme: denies easy bruising or bleeding  Endo: denies heat/cold intolerance, excessive sweating  Vascular: denies LE edema    Vital Signs Last 24 Hrs  T(C): 36.3 (31 Jan 2021 15:57), Max: 36.3 (31 Jan 2021 15:57)  T(F): 97.3 (31 Jan 2021 15:57), Max: 97.3 (31 Jan 2021 15:57)  HR: 117 (31 Jan 2021 15:57) (110 - 121)  BP: 107/77 (31 Jan 2021 15:57) (90/57 - 147/85)  BP(mean): --  RR: 19 (31 Jan 2021 15:57) (12 - 20)  SpO2: 94% (31 Jan 2021 15:57) (93% - 95%)                          13.8   13.82 )-----------( 308      ( 31 Jan 2021 12:56 )             41.6    01-31    139  |  103  |  19  ----------------------------<  181<H>  3.8   |  20<L>  |  1.46<H>    Ca    8.4      31 Jan 2021 08:26  Mg     2.3     01-31    TPro  7.8  /  Alb  4.2  /  TBili  0.2  /  DBili  x   /  AST  39  /  ALT  58<H>  /  AlkPhos  111  01-31       PHYSICAL EXAM:  Gen: NAD  Skin: No rashes, bruises, or lesions  Head: Normocephalic, Atraumatic  Face: no edema, erythema, or fluctuance. Parotid glands soft without mass  Eyes: no scleral injection  Nose: Actively bleeding from left nare, 7.5cm RR inflated with 7CCs of air, used to control bleeding, nasopore used in the right nare,  Mouth: Small blood clot suctioned from the oropharynx, no active bleeding, No Stridor / Drooling / Trismus.  Mucosa moist, tongue/uvula midline, oropharynx clear  Neck: Flat, supple, no lymphadenopathy, trachea midline, no masses  Lymphatic: No lymphadenopathy  Resp: breathing easily, no stridor  CV: no peripheral edema/cyanosis  GI: nondistended   Peripheral vascular: no JVD or edema  Neuro: facial nerve intact, no facial droop

## 2021-02-02 NOTE — PROGRESS NOTE ADULT - SUBJECTIVE AND OBJECTIVE BOX
ENT ISSUE/POD: Traumatic left epistaxis    HPI: 46yo M with PMH of IDDM, HTN who presents with facial injury s/p fall. Pt.  sustained L epistaxis despite denying hitting nose directly. Pt was  packed with rapid rhino with no further bleeding. On ciprofloxacin as ppx  Hgb 11.5, Hct 35            PAST MEDICAL & SURGICAL HISTORY:  DM (diabetes mellitus)    HTN (hypertension)    No significant past surgical history      Allergies    penicillin (Unknown)    Intolerances      MEDICATIONS  (STANDING):  busPIRone 10 milliGRAM(s) Oral three times a day  ciprofloxacin     Tablet 500 milliGRAM(s) Oral every 12 hours  clonazePAM  Tablet 1 milliGRAM(s) Oral daily  dextrose 40% Gel 15 Gram(s) Oral once  dextrose 5%. 1000 milliLiter(s) (50 mL/Hr) IV Continuous <Continuous>  dextrose 5%. 1000 milliLiter(s) (100 mL/Hr) IV Continuous <Continuous>  dextrose 50% Injectable 25 Gram(s) IV Push once  dextrose 50% Injectable 12.5 Gram(s) IV Push once  dextrose 50% Injectable 25 Gram(s) IV Push once  DULoxetine 60 milliGRAM(s) Oral daily  glucagon  Injectable 1 milliGRAM(s) IntraMuscular once  influenza   Vaccine 0.5 milliLiter(s) IntraMuscular once  insulin lispro (ADMELOG) corrective regimen sliding scale   SubCutaneous three times a day before meals  multivitamin 1 Tablet(s) Oral daily  propranolol 60 milliGRAM(s) Oral daily  sodium chloride 0.65% Nasal 1 Spray(s) Both Nostrils four times a day    MEDICATIONS  (PRN):  acetaminophen   Tablet .. 650 milliGRAM(s) Oral every 6 hours PRN Mild Pain (1 - 3)  LORazepam   Injectable 2 milliGRAM(s) IV Push every 2 hours PRN CIWA-Ar score increase by 2 points and a total score of 7 or less  oxyCODONE    IR 5 milliGRAM(s) Oral every 4 hours PRN Moderate Pain (4 - 6)      Social History: see consult note    Family history: see consult note    ROS:   ENT: all negative except as noted in HPI   Pulm: denies SOB, cough, hemoptysis  Neuro: denies numbness/tingling, loss of sensation  Endo: denies heat/cold intolerance, excessive sweating      Vital Signs Last 24 Hrs  T(C): 36.7 (02 Feb 2021 09:18), Max: 37.5 (02 Feb 2021 05:13)  T(F): 98 (02 Feb 2021 09:18), Max: 99.5 (02 Feb 2021 05:13)  HR: 93 (02 Feb 2021 09:18) (88 - 112)  BP: 139/92 (02 Feb 2021 09:18) (139/92 - 164/104)  BP(mean): --  RR: 19 (02 Feb 2021 09:18) (17 - 19)  SpO2: 96% (02 Feb 2021 09:18) (94% - 99%)                          11.5   10.05 )-----------( 219      ( 02 Feb 2021 05:49 )             35.3    02-01    137  |  102  |  26<H>  ----------------------------<  161<H>  3.6   |  24  |  0.89    Ca    8.1<L>      01 Feb 2021 06:48  Phos  3.0     01-31  Mg     2.1     02-02    TPro  6.1  /  Alb  3.6  /  TBili  0.5  /  DBili  x   /  AST  22  /  ALT  37  /  AlkPhos  82  02-01       PHYSICAL EXAM:  Gen: NAD  Skin: No rashes, bruises, or lesions  Head: Normocephalic, Atraumatic  Face: no edema, erythema, or fluctuance. Parotid glands soft without mass  Eyes: no scleral injection  Nose:  L rapid rhino in place, inflated, no active bleeding, R nare with nasopore packing, no bleeding  Mouth: No Stridor / Drooling / Trismus.  Mucosa moist, tongue/uvula midline, oropharynx clear with no blood in posterior pharynx  Neck: Flat, supple, no lymphadenopathy, trachea midline, no masses  Lymphatic: No lymphadenopathy  Resp: breathing easily, no stridor  Neuro: facial nerve intact, no facial droop

## 2021-02-02 NOTE — PHYSICAL THERAPY INITIAL EVALUATION ADULT - GAIT DEVIATIONS NOTED, PT EVAL
decreased gracie/increased time in double stance/decreased step length/decreased stride length/decreased weight-shifting ability

## 2021-02-02 NOTE — PHYSICAL THERAPY INITIAL EVALUATION ADULT - IMPAIRED TRANSFERS: SIT/STAND, REHAB EVAL
decreased endurance , sit-stand x2 cg of1 slow and steady fair balance/impaired postural control/decreased strength

## 2021-02-02 NOTE — PHYSICAL THERAPY INITIAL EVALUATION ADULT - IMPAIRMENTS FOUND, PT EVAL
CRESCENCIO cintron packing ; VSS / 88 then repeat following activity 141 /81 ; TELE sinus 80's/aerobic capacity/endurance/gait, locomotion, and balance/muscle strength

## 2021-02-02 NOTE — PHYSICAL THERAPY INITIAL EVALUATION ADULT - PERTINENT HX OF CURRENT PROBLEM, REHAB EVAL
Head CT: No evidence for intracranial hemorrhage, mass effect, or displaced calvarial fracture.Maxillofacial CT: No acute maxillofacial bone fracture. Left frontal scalp laceration with soft tissue swelling along the anterior aspect of the nasal bone. Multiple tiny radiopaque foreign bodies are seen within the laceration.  Swelling seen about the anterior aspect of the nasal bone. Head CT: No evidence for intracranial hemorrhage, mass effect, or displaced calvarial fracture.Maxillofacial CT: No acute maxillofacial bone fracture. Left frontal scalp laceration with soft tissue swelling along the anterior aspect of the nasal bone. Multiple tiny radiopaque foreign bodies are seen within the laceration.  Swelling seen about the anterior aspect of the nasal bone.; 2/1/21 CTA CHEST No PE ; L shoulder XRAY (-) for fx , moderate AC arthrosis; 1/31/21 EKG STach ; 1/31/21 CXR (-)

## 2021-02-02 NOTE — PHYSICAL THERAPY INITIAL EVALUATION ADULT - PLANNED THERAPY INTERVENTIONS, PT EVAL
stair train goal : pt will negotiate a full flight of steps with HR independent in 1 week/balance training/gait training/strengthening/transfer training

## 2021-02-02 NOTE — CONSULT NOTE ADULT - ASSESSMENT
Impression:    Facial abrasion    Recommend:  1.) topical therapy: facial abrasion - cleanse with NS, pat dry, apply wound gel, cover with adaptic dressing daily  2.) Nutrition optimization  3.) Glycemic control    Care as per medicine will follow w/ you  Upon discharge [facility] [address] [place] [address] [phone]  Discussed with clinical nurse  Thank you for this consult  Gissel Ramos, CYN-C, CWOCN [id number]  
44yo male with left sided epistaxis S/P fall controlled with nasal packings. H/H stable.

## 2021-02-02 NOTE — SBIRT NOTE ADULT - NSSBIRTBRIEFINTDET_GEN_A_CORE
Screening results were reviewed with the patient and patient was provided information about healthy guidelines and potential negative consequences associated with level of risk. Motivation and readiness to reduce or stop use was discussed and goals and activities to make changes were suggested/offered. Patient declined for this LCSW to make a referral to substance misuse treatment, but was willing to accept resources for review.

## 2021-02-02 NOTE — PHYSICAL THERAPY INITIAL EVALUATION ADULT - GENERAL OBSERVATIONS, REHAB EVAL
pt received in bed top rials up and call bell in reach , bed in lowest position ; pt with packing L nare (PT reviewed with pt ENT precautions and pt understood ) pt repeat back in own words ;pt L shoulder causing him pain with movement xray (-); packing L nare dried blood pt feels like its still leaking Vane NP informed , no visible leaking; dressing forehead w/ dried scant blood ; R knee abrasions c,d I and L knee small amount

## 2021-02-02 NOTE — PHYSICAL THERAPY INITIAL EVALUATION ADULT - ADDITIONAL COMMENTS
pt lives in a house with his aunt with 2 steps to enter with HR and approx 8 steps with HR to basement where his living quarters are ; walk-in shower ; used no AD PTA ; unemployed; SW following case ; no caregiver ID

## 2021-02-03 LAB
GLUCOSE BLDC GLUCOMTR-MCNC: 137 MG/DL — HIGH (ref 70–99)
GLUCOSE BLDC GLUCOMTR-MCNC: 153 MG/DL — HIGH (ref 70–99)
GLUCOSE BLDC GLUCOMTR-MCNC: 164 MG/DL — HIGH (ref 70–99)
GLUCOSE BLDC GLUCOMTR-MCNC: 170 MG/DL — HIGH (ref 70–99)

## 2021-02-03 PROCEDURE — 99232 SBSQ HOSP IP/OBS MODERATE 35: CPT

## 2021-02-03 PROCEDURE — 93306 TTE W/DOPPLER COMPLETE: CPT | Mod: 26

## 2021-02-03 RX ADMIN — Medication 1 SPRAY(S): at 17:16

## 2021-02-03 RX ADMIN — Medication 1: at 13:17

## 2021-02-03 RX ADMIN — Medication 10 MILLIGRAM(S): at 09:24

## 2021-02-03 RX ADMIN — Medication 1: at 17:41

## 2021-02-03 RX ADMIN — OXYCODONE HYDROCHLORIDE 5 MILLIGRAM(S): 5 TABLET ORAL at 21:39

## 2021-02-03 RX ADMIN — Medication 1 TABLET(S): at 09:24

## 2021-02-03 RX ADMIN — Medication 500 MILLIGRAM(S): at 05:45

## 2021-02-03 RX ADMIN — Medication 1 SPRAY(S): at 05:46

## 2021-02-03 RX ADMIN — DULOXETINE HYDROCHLORIDE 60 MILLIGRAM(S): 30 CAPSULE, DELAYED RELEASE ORAL at 09:24

## 2021-02-03 RX ADMIN — Medication 10 MILLIGRAM(S): at 17:15

## 2021-02-03 RX ADMIN — OXYCODONE HYDROCHLORIDE 5 MILLIGRAM(S): 5 TABLET ORAL at 02:06

## 2021-02-03 RX ADMIN — Medication 1 SPRAY(S): at 23:51

## 2021-02-03 RX ADMIN — Medication 1: at 09:24

## 2021-02-03 RX ADMIN — Medication 650 MILLIGRAM(S): at 05:46

## 2021-02-03 RX ADMIN — Medication 1 MILLIGRAM(S): at 09:24

## 2021-02-03 RX ADMIN — OXYCODONE HYDROCHLORIDE 5 MILLIGRAM(S): 5 TABLET ORAL at 13:20

## 2021-02-03 RX ADMIN — Medication 10 MILLIGRAM(S): at 23:50

## 2021-02-03 RX ADMIN — Medication 1 SPRAY(S): at 13:17

## 2021-02-03 NOTE — PROGRESS NOTE ADULT - PROBLEM SELECTOR PLAN 1
- gram-positive abx coverage for duration of packing placement  - Continue with Nasal Packing (Rapid Rhino)  - Strict blood pressure control.  - Nasal saline, 2 sprays to both nares 4 times a day  - Avoid nasal trauma; no nose rubbing, blowing or manipulating nasal packing.  Sneeze with mouth open and pinching nares.  - Avoid bending with head blow the waist.    -No heavy lifting.
- may d/c abx  - continue nasal saline  - Strict blood pressure control.  - Nasal saline, 2 sprays to both nares 4 times a day  - Avoid nasal trauma; no nose rubbing, blowing or manipulating nasal packing.  Sneeze with mouth open and pinching nares.  - Avoid bending with head blow the waist.    -No heavy lifting.   - f/u outpatient with Dr. Hobbs, Hanna, or Edgar [phone]
gram-positive abx coverage for duration of packing placement  - Continue with Nasal Packing (Rapid Rhino)  - Strict blood pressure control.  - Nasal saline, 2 sprays to both nares 4 times a day  - Avoid nasal trauma; no nose rubbing, blowing or manipulating nasal packing.  Sneeze with mouth open and pinching nares.  - Avoid bending with head blow the waist.    -No heavy lifting.   Packing to be removed tomorrow if no recurrence of bleeding

## 2021-02-03 NOTE — PROGRESS NOTE ADULT - SUBJECTIVE AND OBJECTIVE BOX
Wound Surgery Progress Note:    HPI:  Pt is a 46 yo man with PMH of DM, HTN ( has been off all Meds, on diet !), depression,  anxiety, ETOH use, who presents with facial injury s/p fall.   Pt states he was drinking EtOH earlier today and was running to cross the street to catch a car, when he tripped and fell on hands and face.   pt with forehead laceration and significant nose bleed, hand and Rt nee abrasions ans skin tears,   pt is an every day drinker of beer and rum denies hx of withdrawal symptoms, seizures.   No AC use.   lac repaired in ED and ENT placed nasal rockets   pt with c/o pain in knee, hands, face, and left shoulder     Follow up visit to patient for facial and left palm skin abrasions. Patient seen with Dr. Woodard. Wounds cleanse, dressed.    PAST MEDICAL & SURGICAL HISTORY:  DM (diabetes mellitus)  HTN (hypertension)  No significant past surgical history    REVIEW OF SYSTEMS  Musculoskeletal:	 ambulatory independently    MEDICATIONS  (STANDING):  busPIRone 10 milliGRAM(s) Oral three times a day  ciprofloxacin     Tablet 500 milliGRAM(s) Oral every 12 hours  clonazePAM  Tablet 1 milliGRAM(s) Oral daily  dextrose 40% Gel 15 Gram(s) Oral once  dextrose 5%. 1000 milliLiter(s) (50 mL/Hr) IV Continuous <Continuous>  dextrose 5%. 1000 milliLiter(s) (100 mL/Hr) IV Continuous <Continuous>  dextrose 50% Injectable 25 Gram(s) IV Push once  dextrose 50% Injectable 12.5 Gram(s) IV Push once  dextrose 50% Injectable 25 Gram(s) IV Push once  DULoxetine 60 milliGRAM(s) Oral daily  glucagon  Injectable 1 milliGRAM(s) IntraMuscular once  influenza   Vaccine 0.5 milliLiter(s) IntraMuscular once  insulin lispro (ADMELOG) corrective regimen sliding scale   SubCutaneous three times a day before meals  multivitamin 1 Tablet(s) Oral daily  propranolol 60 milliGRAM(s) Oral daily  sodium chloride 0.65% Nasal 1 Spray(s) Both Nostrils four times a day    MEDICATIONS  (PRN):  acetaminophen   Tablet .. 650 milliGRAM(s) Oral every 6 hours PRN Mild Pain (1 - 3)  LORazepam   Injectable 2 milliGRAM(s) IV Push every 2 hours PRN CIWA-Ar score increase by 2 points and a total score of 7 or less  oxyCODONE    IR 5 milliGRAM(s) Oral every 4 hours PRN Moderate Pain (4 - 6)    Allergies    penicillin (Unknown)    Intolerances    Vital Signs Last 24 Hrs  T(C): 37.4 (03 Feb 2021 08:00), Max: 37.4 (03 Feb 2021 08:00)  T(F): 99.3 (03 Feb 2021 08:00), Max: 99.3 (03 Feb 2021 08:00)  HR: 90 (03 Feb 2021 10:49) (89 - 108)  BP: 128/85 (03 Feb 2021 10:49) (126/85 - 149/88)  BP(mean): --  RR: 18 (03 Feb 2021 10:49) (18 - 18)  SpO2: 95% (03 Feb 2021 10:49) (93% - 97%)    Physical Exam:  General: A&Ox3, Obese  ENMT: no active bleeding from nares  Respiratory: no SOB on room air  Gastrointestinal: soft NT/ND  Neurology: weakened strength & sensation grossly intact  Musculoskeletal: no contractures  Vascular: BLE edema equal, BLE equally warm  Skin:  Left forehead abrasion L 5cm x W 5cm x 0.1cm, dry blood and drainage adherent to surface, some debris on wound surface remains,   Left palm abrasion - L 5cm x W 5cm x D 0.1cm - wound bed is pink, periwound skin is macerated, scant serosanguinous drainage, No odor, erythema, increased warmth, tenderness, induration, fluctuance    LABS:  02-02    142  |  105  |  18  ----------------------------<  131<H>  4.0   |  24  |  1.05    Ca    8.5      02 Feb 2021 05:49  Mg     2.1     02-02                            11.5   10.05 )-----------( 219      ( 02 Feb 2021 05:49 )             35.3                  Wound Surgery Progress Note:    HPI:  Pt is a 46 yo man with PMH of DM, HTN ( has been off all Meds, on diet !), depression,  anxiety, ETOH use, who presents with facial injury s/p fall.   Pt states he was drinking EtOH earlier today and was running to cross the street to catch a car, when he tripped and fell on hands and face.   pt with forehead laceration and significant nose bleed, hand and Rt knee abrasions and skin tears,   pt is an every day drinker of beer and rum denies hx of withdrawal symptoms, seizures.   No AC use.   lac repaired in ED and ENT placed nasal rockets   pt with c/o pain in knee, hands, face, and left shoulder     Follow up visit to patient for facial and left palm skin abrasions. Patient seen with Dr. Woodard. Wounds cleanse, dressed.    PAST MEDICAL & SURGICAL HISTORY:  DM (diabetes mellitus)  HTN (hypertension)  No significant past surgical history    REVIEW OF SYSTEMS  Musculoskeletal:	 ambulatory independently    MEDICATIONS  (STANDING):  busPIRone 10 milliGRAM(s) Oral three times a day  ciprofloxacin     Tablet 500 milliGRAM(s) Oral every 12 hours  clonazePAM  Tablet 1 milliGRAM(s) Oral daily  dextrose 40% Gel 15 Gram(s) Oral once  dextrose 5%. 1000 milliLiter(s) (50 mL/Hr) IV Continuous <Continuous>  dextrose 5%. 1000 milliLiter(s) (100 mL/Hr) IV Continuous <Continuous>  dextrose 50% Injectable 25 Gram(s) IV Push once  dextrose 50% Injectable 12.5 Gram(s) IV Push once  dextrose 50% Injectable 25 Gram(s) IV Push once  DULoxetine 60 milliGRAM(s) Oral daily  glucagon  Injectable 1 milliGRAM(s) IntraMuscular once  influenza   Vaccine 0.5 milliLiter(s) IntraMuscular once  insulin lispro (ADMELOG) corrective regimen sliding scale   SubCutaneous three times a day before meals  multivitamin 1 Tablet(s) Oral daily  propranolol 60 milliGRAM(s) Oral daily  sodium chloride 0.65% Nasal 1 Spray(s) Both Nostrils four times a day    MEDICATIONS  (PRN):  acetaminophen   Tablet .. 650 milliGRAM(s) Oral every 6 hours PRN Mild Pain (1 - 3)  LORazepam   Injectable 2 milliGRAM(s) IV Push every 2 hours PRN CIWA-Ar score increase by 2 points and a total score of 7 or less  oxyCODONE    IR 5 milliGRAM(s) Oral every 4 hours PRN Moderate Pain (4 - 6)    Allergies    penicillin (Unknown)    Intolerances    Vital Signs Last 24 Hrs  T(C): 37.4 (03 Feb 2021 08:00), Max: 37.4 (03 Feb 2021 08:00)  T(F): 99.3 (03 Feb 2021 08:00), Max: 99.3 (03 Feb 2021 08:00)  HR: 90 (03 Feb 2021 10:49) (89 - 108)  BP: 128/85 (03 Feb 2021 10:49) (126/85 - 149/88)  BP(mean): --  RR: 18 (03 Feb 2021 10:49) (18 - 18)  SpO2: 95% (03 Feb 2021 10:49) (93% - 97%)    Physical Exam:  General: A&Ox3, Obese  ENMT: no active bleeding from nares  Respiratory: no SOB on room air  Gastrointestinal: soft NT/ND  Neurology: weakened strength & sensation grossly intact  Musculoskeletal: no contractures  Vascular: BLE edema equal, BLE equally warm  Skin:  Left forehead abrasion L 5cm x W 5cm x 0.1cm, dry blood and drainage adherent to surface, some debris on wound surface remains,   Left palm abrasion - L 5cm x W 5cm x D 0.1cm - wound bed is pink, periwound skin is macerated, scant serosanguinous drainage, No odor, erythema, increased warmth, tenderness, induration, fluctuance    LABS:  02-02    142  |  105  |  18  ----------------------------<  131<H>  4.0   |  24  |  1.05    Ca    8.5      02 Feb 2021 05:49  Mg     2.1     02-02                            11.5   10.05 )-----------( 219      ( 02 Feb 2021 05:49 )             35.3

## 2021-02-03 NOTE — PROGRESS NOTE ADULT - ATTENDING COMMENTS
Above noted   nasal rockets were out  Pleasant, cooperative tall male with several facial abrasions , no clinical evidence of orbital or nasal fracture  Fully awake and alert without deficits  Op f/u info provided
Pt seen and examined with team at time of service, I was physically present for the key portions for evaluation and management (E/M) service provided, and preformed key portions of the procedure. Agree with above. Plan discussed with primary team.

## 2021-02-03 NOTE — PROGRESS NOTE ADULT - SUBJECTIVE AND OBJECTIVE BOX
ENT ISSUE/POD: Left epistaxis     HPI: 44yo male seen for left epistaxis s/p fall sustaining facial injury, controlled with rapid rhino nasal packing. Pt seen and examined at bedside. No acute events overnight. Pt denies bleeding since packing was placed. Pt denies fever, chills, n/v, HA, SOB, dysphagia, odynophagia, hemoptysis, hoarseness. H/H stable.         PAST MEDICAL & SURGICAL HISTORY:  DM (diabetes mellitus)    HTN (hypertension)    No significant past surgical history      Allergies    penicillin (Unknown)    Intolerances      MEDICATIONS  (STANDING):  busPIRone 10 milliGRAM(s) Oral three times a day  ciprofloxacin     Tablet 500 milliGRAM(s) Oral every 12 hours  clonazePAM  Tablet 1 milliGRAM(s) Oral daily  dextrose 40% Gel 15 Gram(s) Oral once  dextrose 5%. 1000 milliLiter(s) (50 mL/Hr) IV Continuous <Continuous>  dextrose 5%. 1000 milliLiter(s) (100 mL/Hr) IV Continuous <Continuous>  dextrose 50% Injectable 25 Gram(s) IV Push once  dextrose 50% Injectable 12.5 Gram(s) IV Push once  dextrose 50% Injectable 25 Gram(s) IV Push once  DULoxetine 60 milliGRAM(s) Oral daily  glucagon  Injectable 1 milliGRAM(s) IntraMuscular once  influenza   Vaccine 0.5 milliLiter(s) IntraMuscular once  insulin lispro (ADMELOG) corrective regimen sliding scale   SubCutaneous three times a day before meals  multivitamin 1 Tablet(s) Oral daily  propranolol 60 milliGRAM(s) Oral daily  sodium chloride 0.65% Nasal 1 Spray(s) Both Nostrils four times a day    MEDICATIONS  (PRN):  acetaminophen   Tablet .. 650 milliGRAM(s) Oral every 6 hours PRN Mild Pain (1 - 3)  LORazepam   Injectable 2 milliGRAM(s) IV Push every 2 hours PRN CIWA-Ar score increase by 2 points and a total score of 7 or less  oxyCODONE    IR 5 milliGRAM(s) Oral every 4 hours PRN Moderate Pain (4 - 6)      Social History: see consult note    Family history: see consult note    ROS:   ENT: all negative except as noted in HPI   Pulm: denies SOB, cough, hemoptysis  Neuro: denies numbness/tingling, loss of sensation  Endo: denies heat/cold intolerance, excessive sweating      Vital Signs Last 24 Hrs  T(C): 36.7 (03 Feb 2021 04:39), Max: 37.3 (02 Feb 2021 23:43)  T(F): 98.1 (03 Feb 2021 04:39), Max: 99.1 (02 Feb 2021 23:43)  HR: 89 (03 Feb 2021 04:39) (89 - 108)  BP: 137/77 (03 Feb 2021 04:39) (136/80 - 149/88)  BP(mean): --  RR: 18 (03 Feb 2021 04:39) (18 - 19)  SpO2: 93% (03 Feb 2021 04:39) (93% - 97%)                          11.5   10.05 )-----------( 219      ( 02 Feb 2021 05:49 )             35.3    02-02    142  |  105  |  18  ----------------------------<  131<H>  4.0   |  24  |  1.05    Ca    8.5      02 Feb 2021 05:49  Mg     2.1     02-02         PHYSICAL EXAM:  Gen: NAD  Skin: No rashes, bruises, or lesions  Head: bruising noted to left forehead from injury s/p fall   Face: no edema, erythema, or fluctuance. Parotid glands soft without mass  Eyes: no scleral injection  Nose: Rapid rhino nasal packing in place in left nare, packing deflated and removed. No active bleeding noted. Right nare patent. Bacitracin applied to left nare.   Mouth: No Stridor / Drooling / Trismus.  Mucosa moist, tongue/uvula midline, oropharynx clear, no active bleeding noted in posterior pharynx  Neck: Flat, supple, no lymphadenopathy, trachea midline, no masses  Lymphatic: No lymphadenopathy  Resp: breathing easily, no stridor  Neuro: facial nerve intact, no facial droop

## 2021-02-04 LAB
ANION GAP SERPL CALC-SCNC: 16 MMOL/L — SIGNIFICANT CHANGE UP (ref 5–17)
BUN SERPL-MCNC: 14 MG/DL — SIGNIFICANT CHANGE UP (ref 7–23)
CALCIUM SERPL-MCNC: 8.8 MG/DL — SIGNIFICANT CHANGE UP (ref 8.4–10.5)
CHLORIDE SERPL-SCNC: 101 MMOL/L — SIGNIFICANT CHANGE UP (ref 96–108)
CO2 SERPL-SCNC: 23 MMOL/L — SIGNIFICANT CHANGE UP (ref 22–31)
CREAT SERPL-MCNC: 1.16 MG/DL — SIGNIFICANT CHANGE UP (ref 0.5–1.3)
GLUCOSE BLDC GLUCOMTR-MCNC: 129 MG/DL — HIGH (ref 70–99)
GLUCOSE BLDC GLUCOMTR-MCNC: 137 MG/DL — HIGH (ref 70–99)
GLUCOSE BLDC GLUCOMTR-MCNC: 141 MG/DL — HIGH (ref 70–99)
GLUCOSE BLDC GLUCOMTR-MCNC: 145 MG/DL — HIGH (ref 70–99)
GLUCOSE SERPL-MCNC: 137 MG/DL — HIGH (ref 70–99)
HCT VFR BLD CALC: 36.5 % — LOW (ref 39–50)
HGB BLD-MCNC: 11.9 G/DL — LOW (ref 13–17)
MAGNESIUM SERPL-MCNC: 2.1 MG/DL — SIGNIFICANT CHANGE UP (ref 1.6–2.6)
MCHC RBC-ENTMCNC: 28.4 PG — SIGNIFICANT CHANGE UP (ref 27–34)
MCHC RBC-ENTMCNC: 32.6 GM/DL — SIGNIFICANT CHANGE UP (ref 32–36)
MCV RBC AUTO: 87.1 FL — SIGNIFICANT CHANGE UP (ref 80–100)
NRBC # BLD: 0 /100 WBCS — SIGNIFICANT CHANGE UP (ref 0–0)
PLATELET # BLD AUTO: 278 K/UL — SIGNIFICANT CHANGE UP (ref 150–400)
POTASSIUM SERPL-MCNC: 3.4 MMOL/L — LOW (ref 3.5–5.3)
POTASSIUM SERPL-SCNC: 3.4 MMOL/L — LOW (ref 3.5–5.3)
RBC # BLD: 4.19 M/UL — LOW (ref 4.2–5.8)
RBC # FLD: 14.9 % — HIGH (ref 10.3–14.5)
SODIUM SERPL-SCNC: 140 MMOL/L — SIGNIFICANT CHANGE UP (ref 135–145)
WBC # BLD: 9.37 K/UL — SIGNIFICANT CHANGE UP (ref 3.8–10.5)
WBC # FLD AUTO: 9.37 K/UL — SIGNIFICANT CHANGE UP (ref 3.8–10.5)

## 2021-02-04 RX ORDER — POTASSIUM CHLORIDE 20 MEQ
40 PACKET (EA) ORAL ONCE
Refills: 0 | Status: COMPLETED | OUTPATIENT
Start: 2021-02-04 | End: 2021-02-04

## 2021-02-04 RX ADMIN — DULOXETINE HYDROCHLORIDE 60 MILLIGRAM(S): 30 CAPSULE, DELAYED RELEASE ORAL at 09:12

## 2021-02-04 RX ADMIN — Medication 10 MILLIGRAM(S): at 09:12

## 2021-02-04 RX ADMIN — OXYCODONE HYDROCHLORIDE 5 MILLIGRAM(S): 5 TABLET ORAL at 21:43

## 2021-02-04 RX ADMIN — Medication 1 SPRAY(S): at 05:06

## 2021-02-04 RX ADMIN — Medication 1 MILLIGRAM(S): at 09:13

## 2021-02-04 RX ADMIN — Medication 1 SPRAY(S): at 13:11

## 2021-02-04 RX ADMIN — Medication 1 TABLET(S): at 09:12

## 2021-02-04 RX ADMIN — Medication 10 MILLIGRAM(S): at 23:18

## 2021-02-04 RX ADMIN — Medication 10 MILLIGRAM(S): at 17:01

## 2021-02-04 RX ADMIN — OXYCODONE HYDROCHLORIDE 5 MILLIGRAM(S): 5 TABLET ORAL at 09:31

## 2021-02-04 RX ADMIN — OXYCODONE HYDROCHLORIDE 5 MILLIGRAM(S): 5 TABLET ORAL at 04:20

## 2021-02-04 RX ADMIN — Medication 1 SPRAY(S): at 17:01

## 2021-02-04 RX ADMIN — Medication 1 SPRAY(S): at 23:18

## 2021-02-04 RX ADMIN — Medication 40 MILLIEQUIVALENT(S): at 10:31

## 2021-02-04 NOTE — PROVIDER CONTACT NOTE (OTHER) - SITUATION
pt noticed firm/hardened area at old IV site.   IV site removed yesterday, 2/3. pt did not notice this area until now.
Ciwa-ar score increased by 2 points from 2 to 4. noticeable tremor on b/l extremities.
bp 157/100, ok for pt to go to echo at this time?
Patient needs insulin coverage.

## 2021-02-04 NOTE — PROVIDER CONTACT NOTE (OTHER) - ASSESSMENT
AO x 4.Vitals stable. 
patient A&O x4. denies cp/sob. visible tremors noted on b/l upper extremities. patient asymptomatic. Ciwa-ar score remains <7
pt is a+ox4, pt states he has slight headache. bp 157/100, hr 103
pt A04. pt appears comfortable in bed. no erythema noted. denies any pain, shortness of breath, palpitations at this time.

## 2021-02-04 NOTE — PROVIDER CONTACT NOTE (OTHER) - REASON
Ciwa-ar score increased by 2 points from 2 to 4.
Patient needs insulin coverage
bp 157/100, ok for pt to go to echo at this time?
Indurated Left underside forearm discomfort

## 2021-02-04 NOTE — PROVIDER CONTACT NOTE (OTHER) - RECOMMENDATIONS
will continue to monitor.
PRN Ativan 2mg IVP for symptom triggers or increase 2 points from the last score..
Will order insulin coverage as soon as possible.

## 2021-02-04 NOTE — PROVIDER CONTACT NOTE (OTHER) - BACKGROUND
admitted for epistaxis/alcohol intoxication.
admitted for s/p fall and alcohol withdrawal.
pt admitted for fall, CIWA
DX: Concussion with LOC  PMH: DM, HTN, Anxiety, Depression.

## 2021-02-04 NOTE — CHART NOTE - NSCHARTNOTEFT_GEN_A_CORE
AMINA QUINN    Notified by RN patient with complaining of firmness from previous IV site. Patient seen and examined at bedside, NAD. PIV removed from left FA on 2/3, patient is not aware the reason for the removal. No redness, warmth, swelling at site. There is firm area at old PIV insertion site and extends distally. Patient does endorse pain at site only when patient bends arm. Warm compress applied, with site improvement. Will continue to monitor.            Lena BARONECNP-c

## 2021-02-04 NOTE — PROVIDER CONTACT NOTE (OTHER) - ACTION/TREATMENT ORDERED:
NP notified and aware, stat ativan 2 mg IVp ush ordered, bedside echo recommended at this time per NP
RN marked area on arm. warm compress placed. NP to evaluate patient.
okay to give PRN dose. will closely monitor the patient.

## 2021-02-05 ENCOUNTER — TRANSCRIPTION ENCOUNTER (OUTPATIENT)
Age: 46
End: 2021-02-05

## 2021-02-05 VITALS
OXYGEN SATURATION: 95 % | SYSTOLIC BLOOD PRESSURE: 131 MMHG | TEMPERATURE: 98 F | RESPIRATION RATE: 18 BRPM | WEIGHT: 309.09 LBS | HEART RATE: 79 BPM | DIASTOLIC BLOOD PRESSURE: 78 MMHG

## 2021-02-05 LAB
ANION GAP SERPL CALC-SCNC: 10 MMOL/L — SIGNIFICANT CHANGE UP (ref 5–17)
BUN SERPL-MCNC: 14 MG/DL — SIGNIFICANT CHANGE UP (ref 7–23)
CALCIUM SERPL-MCNC: 8.7 MG/DL — SIGNIFICANT CHANGE UP (ref 8.4–10.5)
CHLORIDE SERPL-SCNC: 102 MMOL/L — SIGNIFICANT CHANGE UP (ref 96–108)
CO2 SERPL-SCNC: 27 MMOL/L — SIGNIFICANT CHANGE UP (ref 22–31)
CREAT SERPL-MCNC: 1.16 MG/DL — SIGNIFICANT CHANGE UP (ref 0.5–1.3)
GLUCOSE BLDC GLUCOMTR-MCNC: 147 MG/DL — HIGH (ref 70–99)
GLUCOSE SERPL-MCNC: 123 MG/DL — HIGH (ref 70–99)
POTASSIUM SERPL-MCNC: 3.5 MMOL/L — SIGNIFICANT CHANGE UP (ref 3.5–5.3)
POTASSIUM SERPL-SCNC: 3.5 MMOL/L — SIGNIFICANT CHANGE UP (ref 3.5–5.3)
SODIUM SERPL-SCNC: 139 MMOL/L — SIGNIFICANT CHANGE UP (ref 135–145)

## 2021-02-05 PROCEDURE — 83880 ASSAY OF NATRIURETIC PEPTIDE: CPT

## 2021-02-05 PROCEDURE — 71275 CT ANGIOGRAPHY CHEST: CPT

## 2021-02-05 PROCEDURE — 71046 X-RAY EXAM CHEST 2 VIEWS: CPT

## 2021-02-05 PROCEDURE — 90715 TDAP VACCINE 7 YRS/> IM: CPT

## 2021-02-05 PROCEDURE — 70450 CT HEAD/BRAIN W/O DYE: CPT

## 2021-02-05 PROCEDURE — 93306 TTE W/DOPPLER COMPLETE: CPT

## 2021-02-05 PROCEDURE — U0005: CPT

## 2021-02-05 PROCEDURE — 73030 X-RAY EXAM OF SHOULDER: CPT

## 2021-02-05 PROCEDURE — 80048 BASIC METABOLIC PNL TOTAL CA: CPT

## 2021-02-05 PROCEDURE — 70486 CT MAXILLOFACIAL W/O DYE: CPT

## 2021-02-05 PROCEDURE — 86901 BLOOD TYPING SEROLOGIC RH(D): CPT

## 2021-02-05 PROCEDURE — 85379 FIBRIN DEGRADATION QUANT: CPT

## 2021-02-05 PROCEDURE — 80076 HEPATIC FUNCTION PANEL: CPT

## 2021-02-05 PROCEDURE — 85025 COMPLETE CBC W/AUTO DIFF WBC: CPT

## 2021-02-05 PROCEDURE — 82435 ASSAY OF BLOOD CHLORIDE: CPT

## 2021-02-05 PROCEDURE — 90471 IMMUNIZATION ADMIN: CPT

## 2021-02-05 PROCEDURE — 86850 RBC ANTIBODY SCREEN: CPT

## 2021-02-05 PROCEDURE — 85018 HEMOGLOBIN: CPT

## 2021-02-05 PROCEDURE — 82947 ASSAY GLUCOSE BLOOD QUANT: CPT

## 2021-02-05 PROCEDURE — 86769 SARS-COV-2 COVID-19 ANTIBODY: CPT

## 2021-02-05 PROCEDURE — 97162 PT EVAL MOD COMPLEX 30 MIN: CPT

## 2021-02-05 PROCEDURE — 80053 COMPREHEN METABOLIC PANEL: CPT

## 2021-02-05 PROCEDURE — 85027 COMPLETE CBC AUTOMATED: CPT

## 2021-02-05 PROCEDURE — 96374 THER/PROPH/DIAG INJ IV PUSH: CPT

## 2021-02-05 PROCEDURE — 86703 HIV-1/HIV-2 1 RESULT ANTBDY: CPT

## 2021-02-05 PROCEDURE — 97530 THERAPEUTIC ACTIVITIES: CPT

## 2021-02-05 PROCEDURE — 83605 ASSAY OF LACTIC ACID: CPT

## 2021-02-05 PROCEDURE — 82010 KETONE BODYS QUAN: CPT

## 2021-02-05 PROCEDURE — 86900 BLOOD TYPING SEROLOGIC ABO: CPT

## 2021-02-05 PROCEDURE — 84295 ASSAY OF SERUM SODIUM: CPT

## 2021-02-05 PROCEDURE — 82962 GLUCOSE BLOOD TEST: CPT

## 2021-02-05 PROCEDURE — 83036 HEMOGLOBIN GLYCOSYLATED A1C: CPT

## 2021-02-05 PROCEDURE — 80307 DRUG TEST PRSMV CHEM ANLYZR: CPT

## 2021-02-05 PROCEDURE — 84100 ASSAY OF PHOSPHORUS: CPT

## 2021-02-05 PROCEDURE — 84443 ASSAY THYROID STIM HORMONE: CPT

## 2021-02-05 PROCEDURE — 84132 ASSAY OF SERUM POTASSIUM: CPT

## 2021-02-05 PROCEDURE — U0003: CPT

## 2021-02-05 PROCEDURE — 76377 3D RENDER W/INTRP POSTPROCES: CPT

## 2021-02-05 PROCEDURE — 82330 ASSAY OF CALCIUM: CPT

## 2021-02-05 PROCEDURE — 99285 EMERGENCY DEPT VISIT HI MDM: CPT | Mod: 25

## 2021-02-05 PROCEDURE — 81001 URINALYSIS AUTO W/SCOPE: CPT

## 2021-02-05 PROCEDURE — 80061 LIPID PANEL: CPT

## 2021-02-05 PROCEDURE — 85014 HEMATOCRIT: CPT

## 2021-02-05 PROCEDURE — 84484 ASSAY OF TROPONIN QUANT: CPT

## 2021-02-05 PROCEDURE — 82803 BLOOD GASES ANY COMBINATION: CPT

## 2021-02-05 PROCEDURE — 97116 GAIT TRAINING THERAPY: CPT

## 2021-02-05 PROCEDURE — 83735 ASSAY OF MAGNESIUM: CPT

## 2021-02-05 PROCEDURE — 30901 CONTROL OF NOSEBLEED: CPT

## 2021-02-05 RX ORDER — DULOXETINE HYDROCHLORIDE 30 MG/1
2 CAPSULE, DELAYED RELEASE ORAL
Qty: 0 | Refills: 0 | DISCHARGE

## 2021-02-05 RX ORDER — ACETAMINOPHEN 500 MG
2 TABLET ORAL
Qty: 0 | Refills: 0 | DISCHARGE
Start: 2021-02-05

## 2021-02-05 RX ORDER — SODIUM CHLORIDE 0.65 %
2 AEROSOL, SPRAY (ML) NASAL
Qty: 0 | Refills: 0 | DISCHARGE
Start: 2021-02-05

## 2021-02-05 RX ORDER — IBUPROFEN 200 MG
2 TABLET ORAL
Qty: 0 | Refills: 0 | DISCHARGE

## 2021-02-05 RX ORDER — DULOXETINE HYDROCHLORIDE 30 MG/1
1 CAPSULE, DELAYED RELEASE ORAL
Qty: 0 | Refills: 0 | DISCHARGE
Start: 2021-02-05

## 2021-02-05 RX ADMIN — DULOXETINE HYDROCHLORIDE 60 MILLIGRAM(S): 30 CAPSULE, DELAYED RELEASE ORAL at 11:37

## 2021-02-05 RX ADMIN — Medication 1 MILLIGRAM(S): at 11:40

## 2021-02-05 RX ADMIN — Medication 650 MILLIGRAM(S): at 11:36

## 2021-02-05 RX ADMIN — Medication 1 TABLET(S): at 11:37

## 2021-02-05 RX ADMIN — Medication 1 SPRAY(S): at 11:38

## 2021-02-05 RX ADMIN — Medication 1 SPRAY(S): at 05:20

## 2021-02-05 RX ADMIN — Medication 10 MILLIGRAM(S): at 08:16

## 2021-02-05 NOTE — PROGRESS NOTE ADULT - ASSESSMENT
M E D I C A L   A T T E N D I N G    F O L L O W    U P   N O T E  (02-05-21 )                                     ------------------------------------------------------------------------------------------------    patient evaluated by me, case discussed with team, chart, medications, and physical exam reviewed, labs / tests  and vitals reviewed by me, as bellow.   Patient is stable for discharge today.  Patient to follow up with  PMD ..  See discharge document for full note.  meds reviewed with NP..  diet adn life style changes and monitoring to improve Bp and glycemic control as discussed                                             ( note written   02-05-21 )    ==================>> MEDICATIONS <<====================    busPIRone 10 milliGRAM(s) Oral three times a day  clonazePAM  Tablet 1 milliGRAM(s) Oral daily  dextrose 40% Gel 15 Gram(s) Oral once  dextrose 5%. 1000 milliLiter(s) IV Continuous <Continuous>  dextrose 5%. 1000 milliLiter(s) IV Continuous <Continuous>  dextrose 50% Injectable 25 Gram(s) IV Push once  dextrose 50% Injectable 12.5 Gram(s) IV Push once  dextrose 50% Injectable 25 Gram(s) IV Push once  DULoxetine 60 milliGRAM(s) Oral daily  glucagon  Injectable 1 milliGRAM(s) IntraMuscular once  influenza   Vaccine 0.5 milliLiter(s) IntraMuscular once  insulin lispro (ADMELOG) corrective regimen sliding scale   SubCutaneous three times a day before meals  multivitamin 1 Tablet(s) Oral daily  propranolol 60 milliGRAM(s) Oral daily  sodium chloride 0.65% Nasal 1 Spray(s) Both Nostrils four times a day    MEDICATIONS  (PRN):  acetaminophen   Tablet .. 650 milliGRAM(s) Oral every 6 hours PRN Mild Pain (1 - 3)  oxyCODONE    IR 5 milliGRAM(s) Oral every 4 hours PRN Moderate Pain (4 - 6)    ___________  Active diet:  Diet, Consistent Carbohydrate w/Evening Snack:   Supplement Feeding Modality:  Oral  Probiotic Yogurt/Smoothie Cans or Servings Per Day:  2       Frequency:  Daily  ___________________    ==================>> VITAL SIGNS <<==================    T(C): 36.9 (02-05-21 @ 04:25), Max: 37.2 (02-04-21 @ 20:04)  HR: 79 (02-05-21 @ 04:25) (79 - 99)  BP: 131/78 (02-05-21 @ 04:25) (131/78 - 133/80)  RR: 18 (02-05-21 @ 04:25) (18 - 18)  SpO2: 95% (02-05-21 @ 04:25) (94% - 95%)     POCT Blood Glucose.: 147 mg/dL (05 Feb 2021 08:51)  POCT Blood Glucose.: 141 mg/dL (04 Feb 2021 21:21)  POCT Blood Glucose.: 137 mg/dL (04 Feb 2021 17:21)  POCT Blood Glucose.: 129 mg/dL (04 Feb 2021 12:54)    I&O's Summary    04 Feb 2021 07:01  -  05 Feb 2021 07:00  --------------------------------------------------------  IN: 360 mL / OUT: 0 mL / NET: 360 mL    05 Feb 2021 07:01  -  05 Feb 2021 12:51  --------------------------------------------------------  IN: 240 mL / OUT: 0 mL / NET: 240 mL       ==================>> LAB AND IMAGING <<==================                        11.9   9.37  )-----------( 278      ( 04 Feb 2021 06:47 )             36.5        02-05    139  |  102  |  14  ----------------------------<  123<H>  3.5   |  27  |  1.16    Ca    8.7      05 Feb 2021 07:17  Mg     2.1     02-04    TSH:      2.31   (01-31-21)           Lipid profile:  (02-01-21)     Total: 221     LDL  : (p)     HDL  :36     TG   :181     HgA1C:   (01-31-21)          (01-31-21)      6.8    WBC count:   9.37 <<== ,  10.05 <<== ,  11.54 <<== ,  11.53 <<== ,  13.82 <<==   Hemoglobin:   11.9 <<==,  11.5 <<==,  11.4 <<==,  13.1 <<==,  13.8 <<==  platelets:  278 <==, 219 <==, 229 <==, 242 <==, 308 <==, 269 <==    Creatinine:  1.16  <<==, 1.16  <<==, 1.05  <<==, 0.89  <<==, 0.94  <<==, 1.46  <<==  Sodium:   139  <==, 140  <==, 142  <==, 137  <==, 140  <==, 139  <==       AST:          22 <== , 29 <==      ALT:        37  <== , 51  <==      AP:        82  <=, 93  <=     Bili:        0.5  <=, 0.4  <=       
  _________________________________________________________________________________________  ========>>  M E D I C A L   A T T E N D I N G    F O L L O W  U P  N O T E  <<=========  -----------------------------------------------------------------------------------------------------    - Patient seen and examined by me earlier today.   - In summary,  AMINA QUINN is a 45y year old man admitted post fall   - Patient today overall doing ok, comfortable, + continues to have some pain in left shoulder.. otherwise improved     ==================>> REVIEW OF SYSTEM <<=================    GEN: no fever, no chills, + pain as above   RESP: no SOB, no cough  CVS: no chest pain, no palpitation  GI: no abdominal pain, no nausea, no constipation, no diarrhea  : no dysuria, no frequency  Neuro: no headache, no dizziness     no significant asterixis noted   Derm : no itching, no rash    ==================>> PHYSICAL EXAM <<=================    GEN: A&O X 3 , NAD , comfortable in bed   HEENT: NCAT, PERRL, MMM, hearing intact, nasal packings in   Neck: supple , no JVD appreciated  CVS: S1S2 , regular , tachy   PULM: CTA B/L,  limited exam due to body habitus.   ABD.: soft. non tender, non distended,  obese  Extrem: intact pulses , no edema       injuries / wounds as noted > dressed, stable   PSYCH : normal mood,  not anxious                                           ( Note written 02-02-21 )    ==================>> MEDICATIONS <<====================    busPIRone 10 milliGRAM(s) Oral three times a day  ciprofloxacin     Tablet 500 milliGRAM(s) Oral every 12 hours  clonazePAM  Tablet 1 milliGRAM(s) Oral daily  dextrose 40% Gel 15 Gram(s) Oral once  dextrose 5%. 1000 milliLiter(s) IV Continuous <Continuous>  dextrose 5%. 1000 milliLiter(s) IV Continuous <Continuous>  dextrose 50% Injectable 25 Gram(s) IV Push once  dextrose 50% Injectable 12.5 Gram(s) IV Push once  dextrose 50% Injectable 25 Gram(s) IV Push once  DULoxetine 60 milliGRAM(s) Oral daily  glucagon  Injectable 1 milliGRAM(s) IntraMuscular once  influenza   Vaccine 0.5 milliLiter(s) IntraMuscular once  insulin lispro (ADMELOG) corrective regimen sliding scale   SubCutaneous three times a day before meals  multivitamin 1 Tablet(s) Oral daily  propranolol 60 milliGRAM(s) Oral daily  sodium chloride 0.65% Nasal 1 Spray(s) Both Nostrils four times a day    MEDICATIONS  (PRN):  acetaminophen   Tablet .. 650 milliGRAM(s) Oral every 6 hours PRN Mild Pain (1 - 3)  LORazepam   Injectable 2 milliGRAM(s) IV Push every 2 hours PRN CIWA-Ar score increase by 2 points and a total score of 7 or less  oxyCODONE    IR 5 milliGRAM(s) Oral every 4 hours PRN Moderate Pain (4 - 6)    ___________  Active diet:  Diet, Regular:   Consistent Carbohydrate Evening Snack (CSTCHOSN)  ___________________    ==================>> VITAL SIGNS <<==================  Height (cm): 193  Weight (kg): 136.985  BMI (kg/m2): 36.8  Vital Signs Last 24 HrsT(C): 36.9 (02-02-21 @ 16:30)  T(F): 98.4 (02-02-21 @ 16:30), Max: 99.5 (02-02-21 @ 05:13)  HR: 101 (02-02-21 @ 16:30) (88 - 112)  BP: 142/90 (02-02-21 @ 16:30)  RR: 18 (02-02-21 @ 16:30) (18 - 19)  SpO2: 94% (02-02-21 @ 16:30) (94% - 99%)      POCT Blood Glucose.: 210 mg/dL (02 Feb 2021 13:01)  POCT Blood Glucose.: 151 mg/dL (02 Feb 2021 08:49)  POCT Blood Glucose.: 228 mg/dL (01 Feb 2021 21:57)     ==================>> LAB AND IMAGING <<==================                        11.5   10.05 )-----------( 219      ( 02 Feb 2021 05:49 )             35.3        02-02    142  |  105  |  18  ----------------------------<  131<H>  4.0   |  24  |  1.05    Ca    8.5      02 Feb 2021 05:49  Phos  3.0     01-31  Mg     2.1     02-02    TPro  6.1  /  Alb  3.6  /  TBili  0.5  /  DBili  x   /  AST  22  /  ALT  37  /  AlkPhos  82  02-01    WBC count:   10.05 <<== ,  11.54 <<== ,  11.53 <<== ,  13.82 <<== ,  10.32 <<==   Hemoglobin:   11.5 <<==,  11.4 <<==,  13.1 <<==,  13.8 <<==,  15.2 <<==  platelets:  219 <==, 229 <==, 242 <==, 308 <==, 269 <==    Creatinine:  1.05  <<==, 0.89  <<==, 0.94  <<==, 1.46  <<==  Sodium:   142  <==, 137  <==, 140  <==, 139  <==       AST:          22 <== , 29 <== , 39 <==      ALT:        37  <== , 51  <== , 58  <==      AP:        82  <=, 93  <=, 111  <=     Bili:        0.5  <=, 0.4  <=, 0.2  <=    TSH:      2.31   (01-31-21)           Lipid profile:  (02-01-21)     Total: 221     LDL  : (p)     HDL  :36     TG   :181     HgA1C:     (01-31-21)      6.8    UTOX: negative    D-Dimer Assay, Quantitative: 282 ng/mL DDU (01.31.21 @ 21:51)    CTA: No PE    Left shoulder Xray : no fracture     _______________________  C U L T U R E S :    COVID-19 IgG Antibody Index: 0.08 (01-31-21 @ 23:28)    COVID-19 PCR: NotDetec (01-31-21 @ 14:48)    ___________________________________________________________________________________  ===============>>  A S S E S S M E N T   A N D   P L A N <<===============  ------------------------------------------------------------------------------------------  Pt is a 44 yo man with PMH of DM, HTN ( has been off all Meds, on diet !), depression,  anxiety, ETOH use, who presents with facial injury s/p fall.   Pt states he was drinking EtOH earlier today and was running to cross the street to catch a car, when he tripped and fell on hands and face.     Problem/Plan - 1:  ·  Problem: Fall, initial encounter.  Plan: trip and fall with injuries to face and extremities  local care  pain mgmt  eventual PT eval  monitor for neuro changes  ETOH cessation education.     Problem/Plan - 2:  ·  Problem: Epistaxis due to trauma.  Plan: ENT appreciated  nares packed  on abx per ENT  f/u and DC of nasal packing as able by ENT   monitor  pain mgmt.     Problem/Plan - 3:  ·  Problem: Forehead abrasion, initial encounter.  Plan: local care   pain mgmt  wound care following     Problem/Plan - 4:  ·  Problem: Alcoholic intoxication without complication.    CIWA protocol with PRN meds  monitor for signs of withdrawal as may develop soon   already on daily Klonopin at home> continue.     Problem/Plan - 5:  ·  Problem: Tachycardia and HTN likely related to pain and ETOH  no PE on CTA  echo  tele.   treat underlying causes as above      overall improved     Problem/Plan - 6:  Problem: Type 2 diabetes mellitus without complication, without long-term current use of insulin.  Plan: not on meds / stopped  A1C 6.8  monitor FS  RISS.   would consider meds on DC vs continued life style change   nutrition consult when pt a bit better     Problem/Plan - 7:  ·  Problem: Essential hypertension.  Plan: on propranolol at home > continue   monitor  adjust meds as needed  treat underlying causes as above   echo.     Problem/Plan - 8:  ·  Problem: Anxiety and depression.    continue home meds  psych eval as needed.     -GI/DVT Prophylaxis per protocol.    --------------------------------------------  Case discussed with pt, NP   Education given on findings and plan of care  ___________________________  HDread London D.O.  Pager: [phone]    
  _________________________________________________________________________________________  ========>>  M E D I C A L   A T T E N D I N G    F O L L O W  U P  N O T E  <<=========  -----------------------------------------------------------------------------------------------------    - Patient seen and examined by me earlier today.   - In summary,  AMINA QUINN is a 45y year old man admitted post fall   - Patient today overall doing ok, comfortable, left shoulder pain better, nasal packing out  .. otherwise improved     ==================>> REVIEW OF SYSTEM <<=================    GEN: no fever, no chills, + pain as above   RESP: no SOB, no cough  CVS: no chest pain, no palpitation  GI: no abdominal pain, no nausea  : no dysuria, no frequency  Neuro: no headache, no dizziness  Derm : no itching, no rash    ==================>> PHYSICAL EXAM <<=================    GEN: A&O X 3 , NAD , comfortable in bed   HEENT: NCAT, PERRL, MMM, hearing intact  Neck: supple , no JVD appreciated  CVS: S1S2 , regular   PULM: CTA B/L,  limited exam due to body habitus.   ABD.: soft. non tender, non distended,  obese  Extrem: intact pulses , no edema       injuries / wounds as noted > dressed, stable        left antecubital thrombophlebitis > has hot packs   PSYCH : normal mood,  not anxious  NEURO:   very MILD asterixis noted                                            ( Note written 02-04-21 )    ==================>> MEDICATIONS <<====================    busPIRone 10 milliGRAM(s) Oral three times a day  clonazePAM  Tablet 1 milliGRAM(s) Oral daily  dextrose 40% Gel 15 Gram(s) Oral once  dextrose 5%. 1000 milliLiter(s) IV Continuous <Continuous>  dextrose 5%. 1000 milliLiter(s) IV Continuous <Continuous>  dextrose 50% Injectable 25 Gram(s) IV Push once  dextrose 50% Injectable 12.5 Gram(s) IV Push once  dextrose 50% Injectable 25 Gram(s) IV Push once  DULoxetine 60 milliGRAM(s) Oral daily  glucagon  Injectable 1 milliGRAM(s) IntraMuscular once  influenza   Vaccine 0.5 milliLiter(s) IntraMuscular once  insulin lispro (ADMELOG) corrective regimen sliding scale   SubCutaneous three times a day before meals  multivitamin 1 Tablet(s) Oral daily  propranolol 60 milliGRAM(s) Oral daily  sodium chloride 0.65% Nasal 1 Spray(s) Both Nostrils four times a day    MEDICATIONS  (PRN):  acetaminophen   Tablet .. 650 milliGRAM(s) Oral every 6 hours PRN Mild Pain (1 - 3)  oxyCODONE    IR 5 milliGRAM(s) Oral every 4 hours PRN Moderate Pain (4 - 6)    ___________  Active diet:  Diet, Consistent Carbohydrate w/Evening Snack:   Supplement Feeding Modality:  Oral  Probiotic Yogurt/Smoothie Cans or Servings Per Day:  2       Frequency:  Daily  ___________________    ==================>> VITAL SIGNS <<==================    Vital Signs Last 24 HrsT(C): 36.5 (02-04-21 @ 12:30)  T(F): 97.7 (02-04-21 @ 12:30), Max: 99.9 (02-03-21 @ 20:17)  HR: 85 (02-04-21 @ 12:30) (85 - 104)  BP: 136/83 (02-04-21 @ 12:30)  RR: 18 (02-04-21 @ 12:30) (18 - 18)  SpO2: 93% (02-04-21 @ 12:30) (93% - 96%)    CAPILLARY BLOOD GLUCOSE      POCT Blood Glucose.: 137 mg/dL (04 Feb 2021 17:21)  POCT Blood Glucose.: 129 mg/dL (04 Feb 2021 12:54)  POCT Blood Glucose.: 145 mg/dL (04 Feb 2021 09:11)  POCT Blood Glucose.: 137 mg/dL (03 Feb 2021 21:19)     ==================>> LAB AND IMAGING <<==================                        11.9   9.37  )-----------( 278      ( 04 Feb 2021 06:47 )             36.5        02-04    140  |  101  |  14  ----------------------------<  137<H>  3.4<L>   |  23  |  1.16    Ca    8.8      04 Feb 2021 06:45  Mg     2.1     02-04    WBC count:   9.37 <<== ,  10.05 <<== ,  11.54 <<== ,  11.53 <<== ,  13.82 <<== ,  10.32 <<==   Hemoglobin:   11.9 <<==,  11.5 <<==,  11.4 <<==,  13.1 <<==,  13.8 <<==,  15.2 <<==  platelets:  278 <==, 219 <==, 229 <==, 242 <==, 308 <==, 269 <==    Creatinine:  1.16  <<==, 1.05  <<==, 0.89  <<==, 0.94  <<==, 1.46  <<==  Sodium:   140  <==, 142  <==, 137  <==, 140  <==, 139  <==       AST:          22 <== , 29 <== , 39 <==      ALT:        37  <== , 51  <== , 58  <==      AP:        82  <=, 93  <=, 111  <=     Bili:        0.5  <=, 0.4  <=, 0.2  <=    ___________________________________________________________________________________  ===============>>  A S S E S S M E N T   A N D   P L A N <<===============  ------------------------------------------------------------------------------------------  Pt is a 44 yo man with PMH of DM, HTN ( has been off all Meds, on diet !), depression,  anxiety, ETOH use, who presents with facial injury s/p fall.   Pt states he was drinking EtOH earlier today and was running to cross the street to catch a car, when he tripped and fell on hands and face.     Problem/Plan - 1:  ·  Problem: Fall, initial encounter.  Plan: trip and fall with injuries to face and extremities  local care  pain mgmt  eventual PT eval  monitor for neuro changes  ETOH cessation education discussed in detail     Problem/Plan - 2:  ·  Problem: Epistaxis due to trauma.  Plan: ENT appreciated  nares packing out   monitor for rebleeding   pain mgmt.     Problem/Plan - 3:  ·  Problem: Forehead and extremity abrasions  local care   pain mgmt  wound care following     Problem/Plan - 4:  ·  Problem: Alcoholic intoxication without complication.    CIWA protocol with PRN meds  monitor for further signs of withdrawal   already on daily Klonopin at home> continue.     Problem/Plan - 5:  ·  Problem: Tachycardia and HTN likely related to pain and ETOH  improved   no PE on CTA  echo  tele.   treat underlying causes as above      overall improved     Problem/Plan - 6:  Problem: Type 2 diabetes mellitus without complication, without long-term current use of insulin.  Plan: not on meds / stopped  A1C 6.8  monitor FS  RISS.   would consider meds on DC vs continued life style change   nutrition consult when pt a bit better     Problem/Plan - 7:  ·  Problem: Essential hypertension.  Plan: on propranolol at home > continue   monitor  adjust meds as needed  treat underlying causes as above   echo.     Problem/Plan - 8:  ·  Problem: Anxiety and depression.    continue home meds  psych eval as needed.     -GI/DVT Prophylaxis per protocol.    DC planing possibly tomorrow if stable     --------------------------------------------  Case discussed with pt, NP   Education given on findings and plan of care  ___________________________  H. JEANE [place].  Pager: [phone]    
Impression:    Facial abrasion  Left palm abrasion    Recommend:  1.) topical therapy: facial abrasion - cleanse with NS, pat dry, apply wound gel, cover with adaptic dressing daily  Left palm abrasion - cleanse with NS, pat dry, apply wound gel, cover with adaptic, then DSD, secure with marsha daily  2.) Nutrition optimization  3.) Glycemic control    Care as per medicine will follow w/ you  Upon discharge [facility] [address] [place] [address] [phone]  Discussed with clinical nurse  Thank you for this consult  Gissel Ramos, NP-C, CWOCN [id number]  
  _________________________________________________________________________________________  ========>>  M E D I C A L   A T T E N D I N G    F O L L O W  U P  N O T E  <<=========  -----------------------------------------------------------------------------------------------------    - Patient evaluated by me, chart reviewed.   - In summary,  AMINA QUINN is a 45y year old man admitted post fall   - Patient today overall doing ok, comfortable, + continues to have some pain     ==================>> REVIEW OF SYSTEM <<=================    GEN: no fever, no chills, + pain  RESP: no SOB, no cough  CVS: no chest pain, no palpitation  GI: no abdominal pain, no nausea, no constipation, no diarrhea  : no dysuria, no frequency  Neuro: no headache, no dizziness  Derm : no itching, no rash    ==================>> PHYSICAL EXAM <<=================    GEN: A&O X 3 , NAD , comfortable in bes   HEENT: NCAT, PERRL, MMM, hearing intact, nasal packings in   Neck: supple , no JVD appreciated  CVS: S1S2 , regular , tachy   PULM: CTA B/L,  limited exam due to body habitus.   ABD.: soft. non tender, non distended,  obese  Extrem: intact pulses , no edema       injuries / wounds as noted > dressed, stable   PSYCH : normal mood,  not anxious      ==================>> MEDICATIONS <<====================    MEDICATIONS  (STANDING):  busPIRone 10 milliGRAM(s) Oral three times a day  ciprofloxacin     Tablet 500 milliGRAM(s) Oral every 12 hours  clonazePAM  Tablet 1 milliGRAM(s) Oral daily  dextrose 40% Gel 15 Gram(s) Oral once  dextrose 5%. 1000 milliLiter(s) (50 mL/Hr) IV Continuous <Continuous>  dextrose 5%. 1000 milliLiter(s) (100 mL/Hr) IV Continuous <Continuous>  dextrose 50% Injectable 25 Gram(s) IV Push once  dextrose 50% Injectable 12.5 Gram(s) IV Push once  dextrose 50% Injectable 25 Gram(s) IV Push once  DULoxetine 60 milliGRAM(s) Oral daily  glucagon  Injectable 1 milliGRAM(s) IntraMuscular once  influenza   Vaccine 0.5 milliLiter(s) IntraMuscular once  insulin lispro (ADMELOG) corrective regimen sliding scale   SubCutaneous three times a day before meals  multivitamin 1 Tablet(s) Oral daily  propranolol 60 milliGRAM(s) Oral daily  sodium chloride 0.65% Nasal 1 Spray(s) Both Nostrils four times a day  sodium chloride 0.9%. 1000 milliLiter(s) (75 mL/Hr) IV Continuous <Continuous>    MEDICATIONS  (PRN):  acetaminophen   Tablet .. 650 milliGRAM(s) Oral every 6 hours PRN Mild Pain (1 - 3)  LORazepam   Injectable 2 milliGRAM(s) IV Push every 2 hours PRN CIWA-Ar score increase by 2 points and a total score of 7 or less  oxyCODONE    IR 5 milliGRAM(s) Oral every 4 hours PRN Moderate Pain (4 - 6)    ___________  Active diet:  Diet, Regular:   Consistent Carbohydrate Evening Snack (CSTCHOSN)  ___________________    ==================>> VITAL SIGNS <<==================    T(C): 36.3 (21 @ 14:26), Max: 36.7 (21 @ 20:58)  HR: 103 (21 @ 14:38) (88 - 130)  BP: 157/100 (21 @ 14:38) (107/77 - 164/104)  RR: 18 (21 @ 14:26) (16 - 19)  SpO2: 96% (21 @ 14:26) (94% - 97%)     POCT Blood Glucose.: 228 mg/dL (2021 12:42)  POCT Blood Glucose.: 160 mg/dL (2021 08:31)  POCT Blood Glucose.: 172 mg/dL (2021 22:10)  POCT Blood Glucose.: 166 mg/dL (2021 18:39)  POCT Blood Glucose.: 155 mg/dL (2021 16:27)    I&O's Summary    2021 07:01  -  2021 07:00  --------------------------------------------------------  IN: 900 mL / OUT: 1650 mL / NET: -750 mL    2021 07:01  -  2021 15:41  --------------------------------------------------------  IN: 480 mL / OUT: 820 mL / NET: -340 mL     ==================>> LAB AND IMAGING <<==================                        11.4   11.54 )-----------( 229      ( 2021 06:50 )             34.9            137  |  102  |  26<H>  ----------------------------<  161<H>  3.6   |  24  |  0.89    Ca    8.1<L>      2021 06:48  Phos  3.0       Mg     1.9         TPro  6.1  /  Alb  3.6  /  TBili  0.5  /  DBili  x   /  AST  22  /  ALT  37  /  AlkPhos  82                  Urinalysis Basic - ( 2021 21:50 )  Color: Light Yellow / Appearance: Clear / S.024 / pH: x  Gluc: x / Ketone: Moderate  / Bili: Negative / Urobili: Negative   Blood: x / Protein: Trace / Nitrite: Negative   Leuk Esterase: Negative / RBC: 2 /hpf / WBC 0 /HPF   Sq Epi: x / Non Sq Epi: 0 /hpf / Bacteria: Negative    TSH:      2.31   (21)           Lipid profile:  (21)     Total: 221     LDL  : (p)     HDL  :36     TG   :181     HgA1C:     (21)      6.8    UTOX: negative    D-Dimer Assay, Quantitative: 282 ng/mL DDU (21 @ 21:51)    CTA: No PE    Left shoulder Xray : no fracture     ___________________________________________________________________________________  ===============>>  A S S E S S M E N T   A N D   P L A N <<===============  ------------------------------------------------------------------------------------------  Pt is a 46 yo man with PMH of DM, HTN ( has been off all Meds, on diet !), depression,  anxiety, ETOH use, who presents with facial injury s/p fall.   Pt states he was drinking EtOH earlier today and was running to cross the street to catch a car, when he tripped and fell on hands and face.     Problem/Plan - 1:  ·  Problem: Fall, initial encounter.  Plan: trip and fall with injuries to face and extremities  local care  pain mgmt  eventual PT eval  monitor for neuro changes  ETOH cessation education.     Problem/Plan - 2:  ·  Problem: Epistaxis due to trauma.  Plan: ENT appreciated  nares packed  on abx per ENT  f/u and DC of nasal packing as able by ENT   monitor  pain mgmt.     Problem/Plan - 3:  ·  Problem: Forehead abrasion, initial encounter.  Plan: local care   pain mgmt  wound care consult.     Problem/Plan - 4:  ·  Problem: Alcoholic intoxication without complication.    CIWA protocol with PRN meds  already on daily Klonopin at home> continue.     Problem/Plan - 5:  ·  Problem: Tachycardia and HTN likely related to pain and ETOH  no PE on CTA  echo  tele.   treat underlying causes as above     Problem/Plan - 6:  Problem: Lactic acidemia.   improved post IVH > repeat > 1.5    Problem/Plan - 7:  ·  Problem: Type 2 diabetes mellitus without complication, without long-term current use of insulin.  Plan: not on meds / stopped  A1C 6.8  monitor FS  RISS.   would consider meds on DC vs continued life style change   nutrition consult when pt a bit better     Problem/Plan - 8:  ·  Problem: Essential hypertension.  Plan: on propranolol at home > continue   monitor  adjust meds as needed  treat underlying causes as above   echo.     Problem/Plan - 9:  ·  Problem: Anxiety and depression.  Plan: continue home meds  psych eval as needed.     -GI/DVT Prophylaxis per protocol.    --------------------------------------------  Case discussed with NP in detail   Education given on findings and plan of care  ___________________________  KATHRIN London D.O.  Pager: [phone]  21  
  _________________________________________________________________________________________  ========>>  M E D I C A L   A T T E N D I N G    F O L L O W  U P  N O T E  <<=========  -----------------------------------------------------------------------------------------------------    - Patient seen and examined by me earlier today.   - In summary,  AMINA QUINN is a 45y year old man admitted post fall   - Patient today overall doing ok, comfortable, left shoulder pain better, nasal packing out  .. otherwise improved     ==================>> REVIEW OF SYSTEM <<=================    GEN: no fever, no chills, + pain as above   RESP: no SOB, no cough  CVS: no chest pain, no palpitation  GI: no abdominal pain, no nausea  : no dysuria, no frequency  Neuro: no headache, no dizziness  Derm : no itching, no rash    ==================>> PHYSICAL EXAM <<=================    GEN: A&O X 3 , NAD , comfortable in bed   HEENT: NCAT, PERRL, MMM, hearing intact  Neck: supple , no JVD appreciated  CVS: S1S2 , regular   PULM: CTA B/L,  limited exam due to body habitus.   ABD.: soft. non tender, non distended,  obese  Extrem: intact pulses , no edema       injuries / wounds as noted > dressed, stable   PSYCH : normal mood,  not anxious  NEURO:    MILD asterixis noted                                            ( Note written 02-03-21 )    ==================>> MEDICATIONS <<====================    busPIRone 10 milliGRAM(s) Oral three times a day  clonazePAM  Tablet 1 milliGRAM(s) Oral daily  dextrose 40% Gel 15 Gram(s) Oral once  dextrose 5%. 1000 milliLiter(s) IV Continuous <Continuous>  dextrose 5%. 1000 milliLiter(s) IV Continuous <Continuous>  dextrose 50% Injectable 25 Gram(s) IV Push once  dextrose 50% Injectable 12.5 Gram(s) IV Push once  dextrose 50% Injectable 25 Gram(s) IV Push once  DULoxetine 60 milliGRAM(s) Oral daily  glucagon  Injectable 1 milliGRAM(s) IntraMuscular once  influenza   Vaccine 0.5 milliLiter(s) IntraMuscular once  insulin lispro (ADMELOG) corrective regimen sliding scale   SubCutaneous three times a day before meals  multivitamin 1 Tablet(s) Oral daily  propranolol 60 milliGRAM(s) Oral daily  sodium chloride 0.65% Nasal 1 Spray(s) Both Nostrils four times a day    MEDICATIONS  (PRN):  acetaminophen   Tablet .. 650 milliGRAM(s) Oral every 6 hours PRN Mild Pain (1 - 3)  LORazepam   Injectable 2 milliGRAM(s) IV Push every 2 hours PRN CIWA-Ar score increase by 2 points and a total score of 7 or less  oxyCODONE    IR 5 milliGRAM(s) Oral every 4 hours PRN Moderate Pain (4 - 6)    ___________  Active diet:  Diet, Consistent Carbohydrate w/Evening Snack:   Supplement Feeding Modality:  Oral  Probiotic Yogurt/Smoothie Cans or Servings Per Day:  2       Frequency:  Daily  ___________________    ==================>> VITAL SIGNS <<==================    Height (cm): 193  Weight (kg): 136.985  BMI (kg/m2): 36.8  Vital Signs Last 24 HrsT(C): 37.1 (02-03-21 @ 12:20)  T(F): 98.7 (02-03-21 @ 12:20), Max: 99.3 (02-03-21 @ 08:00)  HR: 104 (02-03-21 @ 12:20) (89 - 104)  BP: 157/88 (02-03-21 @ 12:20)  RR: 18 (02-03-21 @ 12:20) (18 - 18)  SpO2: 95% (02-03-21 @ 12:20) (93% - 97%)      POCT Blood Glucose.: 170 mg/dL (03 Feb 2021 17:36)  POCT Blood Glucose.: 164 mg/dL (03 Feb 2021 13:13)  POCT Blood Glucose.: 153 mg/dL (03 Feb 2021 08:46)  POCT Blood Glucose.: 172 mg/dL (02 Feb 2021 20:55)     ==================>> LAB AND IMAGING <<==================                        11.5   10.05 )-----------( 219      ( 02 Feb 2021 05:49 )             35.3        02-02    142  |  105  |  18  ----------------------------<  131<H>  4.0   |  24  |  1.05    Ca    8.5      02 Feb 2021 05:49  Mg     2.1     02-02      WBC count:   10.05 <<== ,  11.54 <<== ,  11.53 <<== ,  13.82 <<== ,  10.32 <<==   Hemoglobin:   11.5 <<==,  11.4 <<==,  13.1 <<==,  13.8 <<==,  15.2 <<==  platelets:  219 <==, 229 <==, 242 <==, 308 <==, 269 <==    Creatinine:  1.05  <<==, 0.89  <<==, 0.94  <<==, 1.46  <<==  Sodium:   142  <==, 137  <==, 140  <==, 139  <==       AST:          22 <== , 29 <== , 39 <==      ALT:        37  <== , 51  <== , 58  <==      AP:        82  <=, 93  <=, 111  <=     Bili:        0.5  <=, 0.4  <=, 0.2  <=    ___________________________________________________________________________________  ===============>>  A S S E S S M E N T   A N D   P L A N <<===============  ------------------------------------------------------------------------------------------  Pt is a 44 yo man with PMH of DM, HTN ( has been off all Meds, on diet !), depression,  anxiety, ETOH use, who presents with facial injury s/p fall.   Pt states he was drinking EtOH earlier today and was running to cross the street to catch a car, when he tripped and fell on hands and face.     Problem/Plan - 1:  ·  Problem: Fall, initial encounter.  Plan: trip and fall with injuries to face and extremities  local care  pain mgmt  eventual PT eval  monitor for neuro changes  ETOH cessation education discussed in detail     Problem/Plan - 2:  ·  Problem: Epistaxis due to trauma.  Plan: ENT appreciated  nares packing out   monitor for rebleeding   pain mgmt.     Problem/Plan - 3:  ·  Problem: Forehead and extremity abrasions  local care   pain mgmt  wound care following     Problem/Plan - 4:  ·  Problem: Alcoholic intoxication without complication.    CIWA protocol with PRN meds  monitor for further signs of withdrawal   already on daily Klonopin at home> continue.     Problem/Plan - 5:  ·  Problem: Tachycardia and HTN likely related to pain and ETOH  improved   no PE on CTA  echo  tele.   treat underlying causes as above      overall improved     Problem/Plan - 6:  Problem: Type 2 diabetes mellitus without complication, without long-term current use of insulin.  Plan: not on meds / stopped  A1C 6.8  monitor FS  RISS.   would consider meds on DC vs continued life style change   nutrition consult when pt a bit better     Problem/Plan - 7:  ·  Problem: Essential hypertension.  Plan: on propranolol at home > continue   monitor  adjust meds as needed  treat underlying causes as above   echo.     Problem/Plan - 8:  ·  Problem: Anxiety and depression.    continue home meds  psych eval as needed.     -GI/DVT Prophylaxis per protocol.    --------------------------------------------  Case discussed with pt, NP   Education given on findings and plan of care  ___________________________  H. JEANE London.  Pager: [phone]    
44yo male with left sided epistaxis S/P fall controlled with nasal packings.
46yo male with left sided epistaxis S/P fall controlled with nasal packings. Packing to be removed tomorow.    
46yo male s/p epistaxis controlled with rapid rhino nasal packing. H/H stable. Nasal packing removed today. No active bleeding noted s/p removal.

## 2021-02-05 NOTE — DISCHARGE NOTE NURSING/CASE MANAGEMENT/SOCIAL WORK - PATIENT PORTAL LINK FT
You can access the FollowMyHealth Patient Portal offered by [facility] by registering at the following website: [email or web address]. By joining [employer]’s FollowMyHealth portal, you will also be able to view your health information using other applications (apps) compatible with our system.

## 2021-02-05 NOTE — DISCHARGE NOTE PROVIDER - PROVIDER TOKENS
PROVIDER:[TOKEN:[[id number]],FOLLOWUP:[1 week]] PROVIDER:[TOKEN:[[id number]],FOLLOWUP:[1 week]],FREE:[LAST:[jeannie],FIRST:[Nemo],PHONE:[(   )    -],FAX:[(   )    -],ADDRESS:[PCP],FOLLOWUP:[1 week]]

## 2021-02-05 NOTE — DISCHARGE NOTE PROVIDER - CARE PROVIDER_API CALL
Yelitza Ferreira)  Otolaryngology  [address] [place] [address], [address]  [place] [address]  Phone: [phone]  Fax: [phone]  Follow Up Time: 1 week   Yelitza Ferreira)  Otolaryngology  [address] [place] [address], [address]  [place] [address]  Phone: [phone]  Fax: [phone]  Follow Up Time: 1 week    Nemo dennis  PCP  Phone: (   )    -  Fax: (   )    -  Follow Up Time: 1 week

## 2021-02-05 NOTE — DISCHARGE NOTE PROVIDER - HOSPITAL COURSE
Pt is a 46 yo man with PMH of DM, HTN ( has been off all Meds, on diet !), depression,  anxiety, ETOH use, who presents with facial injury s/p fall.   Pt states he was drinking EtOH earlier today and was running to cross the street to catch a car, when he tripped and fell on hands and face.      Problem/Plan - 1:  ·  Problem: Fall, initial encounter.  Plan: trip and fall with injuries to face and extremities  local care  pain mgmt  eventual PT eval  monitor for neuro changes  ETOH cessation education discussed in detail      Problem/Plan - 2:  ·  Problem: Epistaxis due to trauma.  Plan: ENT appreciated  nares packing out   monitor for rebleeding   pain mgmt.      Problem/Plan - 3:  ·  Problem: Forehead and extremity abrasions  local care   pain mgmt  wound care following      Problem/Plan - 4:  ·  Problem: Alcoholic intoxication without complication.    CIWA protocol with PRN meds  monitor for further signs of withdrawal   already on daily Klonopin at home> continue.      Problem/Plan - 5:  ·  Problem: Tachycardia and HTN likely related to pain and ETOH  improved   no PE on CTA  echo  tele.   treat underlying causes as above      overall improved      Problem/Plan - 6:  Problem: Type 2 diabetes mellitus without complication, without long-term current use of insulin.  Plan: not on meds / stopped  A1C 6.8  monitor FS  RISS.   would consider meds on DC vs continued life style change   nutrition consult when pt a bit better      Problem/Plan - 7:  ·  Problem: Essential hypertension.  Plan: on propranolol at home > continue   monitor  adjust meds as needed  treat underlying causes as above   echo.      Problem/Plan - 8:  ·  Problem: Anxiety and depression.    continue home meds  psych eval as needed.     -GI/DVT Prophylaxis per protocol.    DC planing possibly tomorrow if stable      Pt is a 44 yo man with PMH of DM, HTN ( has been off all Meds, on diet !), depression,  anxiety, ETOH use, who presents with facial injury s/p fall. Pt states he was drinking EtOH earlier today and was running to cross the street to catch a car, when he tripped and fell on hands and face. Pt sustained facial /knee abrasions. seen by wound care; cont local wound care. CT head neg; Pt active drinker. cont CIWA protocol with ATivan PRN, cont home Klonopin. Pt initially with tachycardia; CTA neg for PE: Likely  from withdrawal; Now resolved. Pt with severe nose bleed from fall; required L Rhino Rocket and L nose packing; ENT on board; Rhino rocket since removed; cont nasal spray qid.  HgA1c 6.8. cont RISS.  s/p Nutrition consult and diabetic teaching. Pt had ECHO showing no wall motion abnormalities. LV remodelling . Seen by social work. outpt resources for alcohol rehab provided. PT recommends home PT.

## 2021-02-05 NOTE — DISCHARGE NOTE PROVIDER - NSDCCAREPROVSEEN_GEN_ALL_CORE_FT
Beto Stoddard  Ordering Physician  Tamera Ely  [facility]  Team [facility]

## 2021-02-05 NOTE — DISCHARGE NOTE PROVIDER - NSDCMRMEDTOKEN_GEN_ALL_CORE_FT
Detail Level: Generalized Detail Level: Detailed acetaminophen 325 mg oral tablet: 2 tab(s) orally every 6 hours, As needed, Mild Pain (1 - 3)  busPIRone 10 mg oral tablet: 1 tab(s) orally 3 times a day  clonazePAM 1 mg oral tablet: 1 tab(s) orally once a day    NOTE: RX DISPENSED SIG 1 tab 3 times a day  DULoxetine 60 mg oral delayed release capsule: 1 cap(s) orally once a day  Multiple Vitamins oral tablet: 1 tab(s) orally once a day  propranolol 60 mg oral tablet: 1 tab(s) orally once a day    NOTE: RX DISPENSED SI tab twice a day  sodium chloride 0.65% nasal spray: 2 spray(s) nasal 4 times a day

## 2021-02-05 NOTE — DISCHARGE NOTE PROVIDER - CARE PROVIDERS DIRECT ADDRESSES
[email or web address]

## 2021-02-05 NOTE — DISCHARGE NOTE PROVIDER - NSDCCPCAREPLAN_GEN_ALL_CORE_FT
PRINCIPAL DISCHARGE DIAGNOSIS  Diagnosis: Fall  Assessment and Plan of Treatment: continue physical therapy for strenght and balance training      SECONDARY DISCHARGE DIAGNOSES  Diagnosis: Epistaxis due to trauma  Assessment and Plan of Treatment: COntinue nasal spray  Do not blow your nose  Follow up with ENT    Diagnosis: Alcohol intoxication  Assessment and Plan of Treatment: Follow up with resources given to you by social work  for alcohol use disorder    Diagnosis: Anxiety and depression  Assessment and Plan of Treatment: continue current medications    Diagnosis: Essential hypertension  Assessment and Plan of Treatment: Low salt diet  Activity as tolerated.  Take all medication as prescribed.  Follow up with your medical doctor for routine blood pressure monitoring at your next visit.  Notify your doctor if you have any of the following symptoms:   Dizziness, Lightheadedness, Blurry vision, Headache, Chest pain, Shortness of breath      Diagnosis: Type 2 diabetes mellitus without complication, without long-term current use of insulin  Assessment and Plan of Treatment: HgA1C this admission  6.8  Make sure you get your HgA1c checked every three months.  If you take oral diabetes medications, check your blood glucose two times a day.  If you take insulin, check your blood glucose before meals and at bedtime.  It's important not to skip any meals.  Keep a log of your blood glucose results and always take it with you to your doctor appointments.  Keep a list of your current medications including injectables and over the counter medications and bring this medication list with you to all your doctor appointments.  If you have not seen your ophthalmologist this year call for appointment.  Check your feet daily for redness, sores, or openings. Do not self treat. If no improvement in two days call your primary care physician for an appointment.  Low blood sugar (hypoglycemia) is a blood sugar below 70mg/dl. Check your blood sugar if you feel signs/symptoms of hypoglycemia. If your blood sugar is below 70 take 15 grams of carbohydrates (ex 4 oz of apple juice, 3-4 glucose tablets, or 4-6 oz of regular soda) wait 15 minutes and repeat blood sugar to make sure it comes up above 70.  If your blood sugar is above 70 and you are due for a meal, have a meal.  If you are not due for a meal have a snack.  This snack helps keeps your blood sugar at a safe range.      Diagnosis: Forehead abrasion, initial encounter  Assessment and Plan of Treatment: Continue wound care  Follow up with wound care specialist  Local wound care  facial abrasion - cleanse with NS, pat dry, apply wound gel, cover with adaptic dressing daily  Left palm abrasion - cleanse with NS, pat dry, apply wound gel, cover with adaptic, then DSD, secure with marsha daily

## 2021-02-05 NOTE — PROGRESS NOTE ADULT - PROVIDER SPECIALTY LIST ADULT
Internal Medicine
Wound Care
ENT

## 2021-02-08 PROBLEM — Z00.00 ENCOUNTER FOR PREVENTIVE HEALTH EXAMINATION: Status: ACTIVE | Noted: 2021-02-08

## 2021-02-10 PROBLEM — E11.9 TYPE 2 DIABETES MELLITUS WITHOUT COMPLICATIONS: Chronic | Status: ACTIVE | Noted: 2021-01-31

## 2021-02-24 ENCOUNTER — APPOINTMENT (OUTPATIENT)
Dept: OTOLARYNGOLOGY | Facility: CLINIC | Age: 46
End: 2021-02-24
Payer: MEDICAID

## 2021-02-24 VITALS
WEIGHT: 306 LBS | HEIGHT: 76 IN | HEART RATE: 79 BPM | BODY MASS INDEX: 37.26 KG/M2 | DIASTOLIC BLOOD PRESSURE: 75 MMHG | TEMPERATURE: 97 F | SYSTOLIC BLOOD PRESSURE: 115 MMHG

## 2021-02-24 DIAGNOSIS — R04.0 EPISTAXIS: ICD-10-CM

## 2021-02-24 PROCEDURE — 31231 NASAL ENDOSCOPY DX: CPT

## 2021-02-24 PROCEDURE — 99072 ADDL SUPL MATRL&STAF TM PHE: CPT

## 2021-02-24 PROCEDURE — 99214 OFFICE O/P EST MOD 30 MIN: CPT | Mod: 25

## 2021-02-24 RX ORDER — PROPRANOLOL HYDROCHLORIDE 60 MG/1
60 CAPSULE, EXTENDED RELEASE ORAL
Refills: 0 | Status: ACTIVE | COMMUNITY

## 2021-02-24 RX ORDER — BUSPIRONE HYDROCHLORIDE 30 MG/1
30 TABLET ORAL
Refills: 0 | Status: ACTIVE | COMMUNITY

## 2021-02-24 RX ORDER — DULOXETINE HYDROCHLORIDE 60 MG/1
60 CAPSULE, DELAYED RELEASE PELLETS ORAL
Refills: 0 | Status: ACTIVE | COMMUNITY

## 2021-02-24 RX ORDER — MULTIVITAMIN
TABLET ORAL
Refills: 0 | Status: ACTIVE | COMMUNITY

## 2021-02-24 RX ORDER — METFORMIN HYDROCHLORIDE 625 MG/1
TABLET ORAL
Refills: 0 | Status: ACTIVE | COMMUNITY

## 2021-02-24 RX ORDER — CLONAZEPAM 1 MG/1
1 TABLET ORAL
Refills: 0 | Status: ACTIVE | COMMUNITY

## 2021-02-24 NOTE — ASSESSMENT
[FreeTextEntry1] : Epistaxis s/p Fall.  Now resolved.  \par - Continue using nasal saline to remove residual crusting\par - F/U PRN

## 2021-02-24 NOTE — CONSULT LETTER
[Dear  ___] : Dear  [unfilled], [Consult Letter:] : I had the pleasure of evaluating your patient, [unfilled]. [Please see my note below.] : Please see my note below. [Consult Closing:] : Thank you very much for allowing me to participate in the care of this patient.  If you have any questions, please do not hesitate to contact me. [Sincerely,] : Sincerely, [FreeTextEntry3] : Yelitza Ferreira MD\par Otolaryngology and Cranial Base Surgery\par Attending Physician - Department of Otolaryngology and Head & Neck Surgery\par [facility]\par  - [employer] [facility]\par Office: [phone]\par Fax: [phone]\par

## 2021-02-24 NOTE — HISTORY OF PRESENT ILLNESS
[de-identified] : Pt. evaluated in hospital for epistaxis s/p Fall 3 weeks ago.  He notes has not had any bleeding for the past 3 weeks.  Using nasal saline spray.  No nasal congestion.  No hx of bleeding disorders and not on any anticoagulation.   Does get some intermittent pain to left side of nose.

## 2021-02-24 NOTE — PROCEDURE
[FreeTextEntry6] : Flexible scope #32 was used. Right nasal passage with normal inferior, middle and superior turbinates. Nasal passage patent with clear middle meatus and sphenoethmoid recess. Left nasal passage with normal inferior, middle and superior turbinates. Nasal passage was patent with some crusting along posterior inferior turbinate.  clear middle meatus and sphenoethmoid recess. No mucopurulence or polyps appreciated. Nasopharynx clear.\par \par

## 2021-06-19 NOTE — PATIENT PROFILE ADULT - ARE SIGNIFICANT INDICATORS COMPLETE.
Letter by Lillie Méndez EPS at      Author: Lillie Méndez EPS Service: -- Author Type: --    Filed:  Encounter Date: 5/8/2019 Status: (Other)         Derrick Silva  [address]  [facility] [address]      May 9, 2019      Dear Mr. Silva,    RE: Remote Results    We are writing to you regarding your recent Remote Pacemaker check from home. Your transmission was received successfully. Battery status is satisfactory at this time.     Your results are within normal limits.    Your next device appointment will be a clinic visit.  Please call in now to schedule.      To schedule or reschedule, please call [phone] and press 1.    NOTE: If you would like to do an extra transmission, please call [phone] and press 3 to speak to a nurse BEFORE transmitting. This ensures that the Device Clinic staff is aware of the reason you are sending a transmission, and can follow-up with you after it has been reviewed.    We will be checking your implanted device from home (remotely) every three months unless otherwise instructed. We will need to see you in the clinic at least once a year. You may need to be seen in the clinic sooner depending on the results of your check.    Please be aware:    The follow-up schedule is like a Physician prescription.    Your remote monitor is paired to your specific implanted device.      Sincerely,    [facility]       
Yes

## 2021-11-07 NOTE — ED ADULT TRIAGE NOTE - HEIGHT IN CM
Upper Respiratory Infection in Children    AMBULATORY CARE:    An upper respiratory infection is also called a common cold. It can affect your child's nose, throat, ears, and sinuses. Most children get about 5 to 8 colds each year.     Common signs and symptoms include the following: Your child's cold symptoms will be worst for the first 3 to 5 days. Your child may have any of the following:     Runny or stuffy nose      Sneezing and coughing    Sore throat or hoarseness    Red, watery, and sore eyes    Tiredness or fussiness    Chills and a fever that usually lasts 1 to 3 days    Headache, body aches, or sore muscles    Seek care immediately if:     Your child's temperature reaches 105°F (40.6°C).      Your child has trouble breathing or is breathing faster than usual.       Your child's lips or nails turn blue.       Your child's nostrils flare when he or she takes a breath.       The skin above or below your child's ribs is sucked in with each breath.       Your child's heart is beating much faster than usual.       You see pinpoint or larger reddish-purple dots on your child's skin.       Your child stops urinating or urinates less than usual.       Your baby's soft spot on his or her head is bulging outward or sunken inward.       Your child has a severe headache or stiff neck.       Your child has chest or stomach pain.       Your baby is too weak to eat.     Contact your child's healthcare provider if:     Your child has a rectal, ear, or forehead temperature higher than 100.4°F (38°C).       Your child has an oral or pacifier temperature higher than 100°F (37.8°C).      Your child has an armpit temperature higher than 99°F (37.2°C).      Your child is younger than 2 years and has a fever for more than 24 hours.       Your child is 2 years or older and has a fever for more than 72 hours.       Your child has had thick nasal drainage for more than 2 days.       Your child has ear pain.       Your child has white spots on his or her tonsils.       Your child coughs up a lot of thick, yellow, or green mucus.       Your child is unable to eat, has nausea, or is vomiting.       Your child has increased tiredness and weakness.      Your child's symptoms do not improve or get worse within 3 days.       You have questions or concerns about your child's condition or care.    Treatment for your child's cold: There is no cure for the common cold. Colds are caused by viruses and do not get better with antibiotics. Most colds in children go away without treatment in 1 to 2 weeks. Do not give over-the-counter (OTC) cough or cold medicines to children younger than 4 years. Your child's healthcare provider may tell you not to give these medicines to children younger than 6 years. OTC cough and cold medicines can cause side effects that may harm your child. Your child may need any of the following to help manage his or her symptoms:     Over the counter Cough suppressants and Decongestants have not been shown to be effective in children. please consult with your physician before giving them to your child.    Acetaminophen decreases pain and fever. It is available without a doctor's order. Ask how much to give your child and how often to give it. Follow directions. Read the labels of all other medicines your child uses to see if they also contain acetaminophen, or ask your child's doctor or pharmacist. Acetaminophen can cause liver damage if not taken correctly.    NSAIDs, such as ibuprofen, help decrease swelling, pain, and fever. This medicine is available with or without a doctor's order. NSAIDs can cause stomach bleeding or kidney problems in certain people. If your child takes blood thinner medicine, always ask if NSAIDs are safe for him. Always read the medicine label and follow directions. Do not give these medicines to children under 6 months of age without direction from your child's healthcare provider.    Do not give aspirin to children under 18 years of age. Your child could develop Reye syndrome if he takes aspirin. Reye syndrome can cause life-threatening brain and liver damage. Check your child's medicine labels for aspirin, salicylates, or oil of wintergreen.       Give your child's medicine as directed. Contact your child's healthcare provider if you think the medicine is not working as expected. Tell him or her if your child is allergic to any medicine. Keep a current list of the medicines, vitamins, and herbs your child takes. Include the amounts, and when, how, and why they are taken. Bring the list or the medicines in their containers to follow-up visits. Carry your child's medicine list with you in case of an emergency.    Care for your child:     Have your child rest. Rest will help his or her body get better.     Give your child more liquids as directed. Liquids will help thin and loosen mucus so your child can cough it up. Liquids will also help prevent dehydration. Liquids that help prevent dehydration include water, fruit juice, and broth. Do not give your child liquids that contain caffeine. Caffeine can increase your child's risk for dehydration. Ask your child's healthcare provider how much liquid to give your child each day.     Clear mucus from your child's nose. Use a bulb syringe to remove mucus from a baby's nose. Squeeze the bulb and put the tip into one of your baby's nostrils. Gently close the other nostril with your finger. Slowly release the bulb to suck up the mucus. Empty the bulb syringe onto a tissue. Repeat the steps if needed. Do the same thing in the other nostril. Make sure your baby's nose is clear before he or she feeds or sleeps. Your child's healthcare provider may recommend you put saline drops into your baby's nose if the mucus is very thick.     Soothe your child's throat. If your child is 8 years or older, have him or her gargle with salt water. Make salt water by dissolving ¼ teaspoon salt in 1 cup warm water.     Soothe your child's cough. You can give honey to children older than 1 year. Give ½ teaspoon of honey to children 1 to 5 years. Give 1 teaspoon of honey to children 6 to 11 years. Give 2 teaspoons of honey to children 12 or older.    Use a cool-mist humidifier. This will add moisture to the air and help your child breathe easier. Make sure the humidifier is out of your child's reach.    Apply petroleum-based jelly around the outside of your child's nostrils. This can decrease irritation from blowing his or her nose.     Keep your child away from smoke. Do not smoke near your child. Do not let your older child smoke. Nicotine and other chemicals in cigarettes and cigars can make your child's symptoms worse. They can also cause infections such as bronchitis or pneumonia. Ask your child's healthcare provider for information if you or your child currently smoke and need help to quit. E-cigarettes or smokeless tobacco still contain nicotine. Talk to your healthcare provider before you or your child use these products.     Prevent the spread of a cold:     Keep your child away from other people during the first 3 to 5 days of his or her cold. The virus is spread most easily during this time.     Wash your hands and your child's hands often. Teach your child to cover his or her nose and mouth when he or she sneezes, coughs, and blows his or her nose. Show your child how to cough and sneeze into the crook of the elbow instead of the hands.      Do not let your child share toys, pacifiers, or towels with others while he or she is sick.     Do not let your child share foods, eating utensils, cups, or drinks with others while he or she is sick.    Follow up with your child's healthcare provider as directed: Write down your questions so you remember to ask them during your child's visits. [phone]

## 2022-01-13 NOTE — ED ADULT NURSE NOTE - NOSE, LEFT NARES APPEARANCE
Francisco Javier Flores called from [address] [facility] needing clinical notes for Patient's ct of the head w/ wo contrast fax# [phone] bleeding

## 2022-05-13 NOTE — PATIENT PROFILE ADULT - NSASFALLATTEMPTOOB_GEN_A_NUR
no Medical Necessity Clause: This procedure was medically necessary because the lesions that were treated were:

## 2022-08-03 ENCOUNTER — OUTPATIENT (OUTPATIENT)
Dept: OUTPATIENT SERVICES | Facility: HOSPITAL | Age: 47
LOS: 1 days | Discharge: HOME | End: 2022-08-03

## 2022-08-04 PROCEDURE — 99215 OFFICE O/P EST HI 40 MIN: CPT | Mod: 95

## 2022-09-09 DIAGNOSIS — F33.2 MAJOR DEPRESSIVE DISORDER, RECURRENT SEVERE WITHOUT PSYCHOTIC FEATURES: ICD-10-CM

## 2023-03-06 ENCOUNTER — RESULT REVIEW (OUTPATIENT)
Age: 48
End: 2023-03-06

## 2023-03-06 ENCOUNTER — APPOINTMENT (OUTPATIENT)
Dept: CT IMAGING | Facility: CLINIC | Age: 48
End: 2023-03-06
Payer: MEDICAID

## 2023-03-06 ENCOUNTER — OUTPATIENT (OUTPATIENT)
Dept: OUTPATIENT SERVICES | Facility: HOSPITAL | Age: 48
LOS: 1 days | End: 2023-03-06
Payer: MEDICAID

## 2023-03-06 DIAGNOSIS — Z00.8 ENCOUNTER FOR OTHER GENERAL EXAMINATION: ICD-10-CM

## 2023-03-06 DIAGNOSIS — R07.89 OTHER CHEST PAIN: ICD-10-CM

## 2023-03-06 PROCEDURE — 75574 CT ANGIO HRT W/3D IMAGE: CPT

## 2023-03-06 PROCEDURE — 75574 CT ANGIO HRT W/3D IMAGE: CPT | Mod: 26

## 2023-06-04 NOTE — ED ADULT NURSE NOTE - IN THE PAST 12 MONTHS HAVE YOU USED DRUGS OTHER THAN THOSE REQUIRED FOR MEDICAL REASON?
Use the eyedrops for 1 week.    Please follow-up with the primary care physician    Return to the ER for worsening sore throat, neck pain or stiffness, chest pain, shortness of breath, or any other concerns   No

## 2024-06-03 NOTE — ED PROVIDER NOTE - IV ALTEPLASE EXCL REL HIDDEN
show Detail Level: Detailed General Sunscreen Counseling: I recommended a broad spectrum sunscreen with a SPF of 30 or higher.  I explained that SPF 30 sunscreens block approximately 97 percent of the sun's harmful rays.  Sunscreens should be applied at least 15 minutes prior to expected sun exposure and then every 2 hours after that as long as sun exposure continues. If swimming or exercising sunscreen should be reapplied every 45 minutes to an hour after getting wet or sweating.  One ounce, or the equivalent of a shot glass full of sunscreen, is adequate to protect the skin not covered by a bathing suit. I also recommended a lip balm with a sunscreen as well. Sun protective clothing can be used in lieu of sunscreen but must be worn the entire time you are exposed to the sun's rays. Products Recommended: Elta Md

## 2024-10-17 ENCOUNTER — NON-APPOINTMENT (OUTPATIENT)
Age: 49
End: 2024-10-17

## 2024-10-22 ENCOUNTER — APPOINTMENT (OUTPATIENT)
Dept: CARDIOLOGY | Facility: CLINIC | Age: 49
End: 2024-10-22
Payer: MEDICAID

## 2024-10-22 ENCOUNTER — NON-APPOINTMENT (OUTPATIENT)
Age: 49
End: 2024-10-22

## 2024-10-22 VITALS
HEART RATE: 81 BPM | SYSTOLIC BLOOD PRESSURE: 136 MMHG | BODY MASS INDEX: 38.36 KG/M2 | HEIGHT: 76 IN | DIASTOLIC BLOOD PRESSURE: 82 MMHG | OXYGEN SATURATION: 96 % | WEIGHT: 315 LBS

## 2024-10-22 DIAGNOSIS — E78.2 MIXED HYPERLIPIDEMIA: ICD-10-CM

## 2024-10-22 DIAGNOSIS — Z00.00 ENCOUNTER FOR GENERAL ADULT MEDICAL EXAMINATION W/OUT ABNORMAL FINDINGS: ICD-10-CM

## 2024-10-22 DIAGNOSIS — I77.810 THORACIC AORTIC ECTASIA: ICD-10-CM

## 2024-10-22 DIAGNOSIS — I25.10 ATHEROSCLEROTIC HEART DISEASE OF NATIVE CORONARY ARTERY W/OUT ANGINA PECTORIS: ICD-10-CM

## 2024-10-22 DIAGNOSIS — E66.01 MORBID (SEVERE) OBESITY DUE TO EXCESS CALORIES: ICD-10-CM

## 2024-10-22 PROCEDURE — 99205 OFFICE O/P NEW HI 60 MIN: CPT

## 2024-10-22 PROCEDURE — G2211 COMPLEX E/M VISIT ADD ON: CPT | Mod: NC

## 2024-10-22 PROCEDURE — 93000 ELECTROCARDIOGRAM COMPLETE: CPT

## 2024-10-22 RX ORDER — TIRZEPATIDE 2.5 MG/.5ML
2.5 INJECTION, SOLUTION SUBCUTANEOUS
Qty: 4 | Refills: 2 | Status: ACTIVE | COMMUNITY
Start: 2024-10-22 | End: 1900-01-01

## 2024-10-22 RX ORDER — ATORVASTATIN CALCIUM 40 MG/1
40 TABLET, FILM COATED ORAL
Qty: 1 | Refills: 1 | Status: ACTIVE | COMMUNITY
Start: 2024-10-22 | End: 1900-01-01

## 2024-11-22 ENCOUNTER — APPOINTMENT (OUTPATIENT)
Dept: CARDIOLOGY | Facility: CLINIC | Age: 49
End: 2024-11-22
Payer: MEDICAID

## 2024-11-22 VITALS — HEIGHT: 76 IN | BODY MASS INDEX: 36.29 KG/M2 | WEIGHT: 298 LBS

## 2024-11-22 DIAGNOSIS — E78.2 MIXED HYPERLIPIDEMIA: ICD-10-CM

## 2024-11-22 DIAGNOSIS — E66.01 MORBID (SEVERE) OBESITY DUE TO EXCESS CALORIES: ICD-10-CM

## 2024-11-22 PROCEDURE — 93306 TTE W/DOPPLER COMPLETE: CPT

## 2024-11-22 PROCEDURE — 99214 OFFICE O/P EST MOD 30 MIN: CPT

## 2024-11-22 PROCEDURE — G2211 COMPLEX E/M VISIT ADD ON: CPT | Mod: NC

## 2025-01-10 ENCOUNTER — APPOINTMENT (OUTPATIENT)
Dept: CARDIOLOGY | Facility: CLINIC | Age: 50
End: 2025-01-10
Payer: MEDICAID

## 2025-01-10 ENCOUNTER — TRANSCRIPTION ENCOUNTER (OUTPATIENT)
Age: 50
End: 2025-01-10

## 2025-01-10 VITALS — BODY MASS INDEX: 35.31 KG/M2 | HEIGHT: 76 IN | WEIGHT: 290 LBS

## 2025-01-10 DIAGNOSIS — E78.2 MIXED HYPERLIPIDEMIA: ICD-10-CM

## 2025-01-10 DIAGNOSIS — E66.01 MORBID (SEVERE) OBESITY DUE TO EXCESS CALORIES: ICD-10-CM

## 2025-01-10 DIAGNOSIS — I25.10 ATHEROSCLEROTIC HEART DISEASE OF NATIVE CORONARY ARTERY W/OUT ANGINA PECTORIS: ICD-10-CM

## 2025-01-10 PROCEDURE — 99213 OFFICE O/P EST LOW 20 MIN: CPT

## 2025-01-10 PROCEDURE — G2211 COMPLEX E/M VISIT ADD ON: CPT | Mod: NC

## 2025-02-13 ENCOUNTER — NON-APPOINTMENT (OUTPATIENT)
Age: 50
End: 2025-02-13

## 2025-02-13 ENCOUNTER — APPOINTMENT (OUTPATIENT)
Dept: CARDIOLOGY | Facility: CLINIC | Age: 50
End: 2025-02-13
Payer: MEDICAID

## 2025-02-13 VITALS
OXYGEN SATURATION: 95 % | BODY MASS INDEX: 35.79 KG/M2 | SYSTOLIC BLOOD PRESSURE: 140 MMHG | HEART RATE: 91 BPM | DIASTOLIC BLOOD PRESSURE: 90 MMHG | WEIGHT: 294 LBS

## 2025-02-13 DIAGNOSIS — I77.810 THORACIC AORTIC ECTASIA: ICD-10-CM

## 2025-02-13 DIAGNOSIS — E78.2 MIXED HYPERLIPIDEMIA: ICD-10-CM

## 2025-02-13 DIAGNOSIS — I25.10 ATHEROSCLEROTIC HEART DISEASE OF NATIVE CORONARY ARTERY W/OUT ANGINA PECTORIS: ICD-10-CM

## 2025-02-13 PROCEDURE — G2211 COMPLEX E/M VISIT ADD ON: CPT | Mod: NC

## 2025-02-13 PROCEDURE — 99214 OFFICE O/P EST MOD 30 MIN: CPT

## 2025-02-13 PROCEDURE — G0537: CPT

## 2025-02-13 PROCEDURE — 93000 ELECTROCARDIOGRAM COMPLETE: CPT

## 2025-03-07 ENCOUNTER — APPOINTMENT (OUTPATIENT)
Dept: CARDIOLOGY | Facility: CLINIC | Age: 50
End: 2025-03-07
Payer: MEDICAID

## 2025-03-07 DIAGNOSIS — E66.01 MORBID (SEVERE) OBESITY DUE TO EXCESS CALORIES: ICD-10-CM

## 2025-03-07 PROCEDURE — 99213 OFFICE O/P EST LOW 20 MIN: CPT

## 2025-03-07 PROCEDURE — G2211 COMPLEX E/M VISIT ADD ON: CPT | Mod: NC

## 2025-04-07 ENCOUNTER — APPOINTMENT (OUTPATIENT)
Dept: CARDIOLOGY | Facility: CLINIC | Age: 50
End: 2025-04-07
Payer: MEDICAID

## 2025-04-07 DIAGNOSIS — E78.2 MIXED HYPERLIPIDEMIA: ICD-10-CM

## 2025-04-07 DIAGNOSIS — E66.01 MORBID (SEVERE) OBESITY DUE TO EXCESS CALORIES: ICD-10-CM

## 2025-04-07 PROCEDURE — 99214 OFFICE O/P EST MOD 30 MIN: CPT | Mod: 95

## 2025-04-07 PROCEDURE — G2211 COMPLEX E/M VISIT ADD ON: CPT | Mod: NC,95

## 2025-04-28 ENCOUNTER — APPOINTMENT (OUTPATIENT)
Dept: CARDIOLOGY | Facility: CLINIC | Age: 50
End: 2025-04-28
Payer: MEDICAID

## 2025-04-28 DIAGNOSIS — E66.01 MORBID (SEVERE) OBESITY DUE TO EXCESS CALORIES: ICD-10-CM

## 2025-04-28 DIAGNOSIS — I25.10 ATHEROSCLEROTIC HEART DISEASE OF NATIVE CORONARY ARTERY W/OUT ANGINA PECTORIS: ICD-10-CM

## 2025-04-28 DIAGNOSIS — E78.2 MIXED HYPERLIPIDEMIA: ICD-10-CM

## 2025-04-28 PROCEDURE — 99214 OFFICE O/P EST MOD 30 MIN: CPT | Mod: 95

## 2025-04-28 PROCEDURE — G2211 COMPLEX E/M VISIT ADD ON: CPT | Mod: NC,95

## 2025-04-28 RX ORDER — TIRZEPATIDE 10 MG/.5ML
10 INJECTION, SOLUTION SUBCUTANEOUS
Qty: 1 | Refills: 1 | Status: ACTIVE | COMMUNITY
Start: 2025-04-28 | End: 1900-01-01

## 2025-06-10 ENCOUNTER — APPOINTMENT (OUTPATIENT)
Dept: CARDIOLOGY | Facility: CLINIC | Age: 50
End: 2025-06-10
Payer: MEDICAID

## 2025-06-10 DIAGNOSIS — E66.01 MORBID (SEVERE) OBESITY DUE TO EXCESS CALORIES: ICD-10-CM

## 2025-06-10 PROCEDURE — 99213 OFFICE O/P EST LOW 20 MIN: CPT | Mod: 95

## 2025-06-10 PROCEDURE — G2211 COMPLEX E/M VISIT ADD ON: CPT | Mod: NC,95

## 2025-06-10 RX ORDER — TIRZEPATIDE 10 MG/.5ML
10 INJECTION, SOLUTION SUBCUTANEOUS
Qty: 1 | Refills: 1 | Status: ACTIVE | COMMUNITY
Start: 2025-06-10 | End: 1900-01-01

## 2025-07-10 ENCOUNTER — APPOINTMENT (OUTPATIENT)
Dept: CARDIOLOGY | Facility: CLINIC | Age: 50
End: 2025-07-10
Payer: MEDICAID

## 2025-07-10 VITALS
HEART RATE: 98 BPM | OXYGEN SATURATION: 95 % | WEIGHT: 294 LBS | DIASTOLIC BLOOD PRESSURE: 80 MMHG | SYSTOLIC BLOOD PRESSURE: 140 MMHG | BODY MASS INDEX: 35.79 KG/M2

## 2025-07-10 PROCEDURE — 99214 OFFICE O/P EST MOD 30 MIN: CPT

## 2025-07-10 PROCEDURE — G2211 COMPLEX E/M VISIT ADD ON: CPT | Mod: NC

## 2025-07-10 PROCEDURE — 93000 ELECTROCARDIOGRAM COMPLETE: CPT

## 2025-07-10 RX ORDER — EVOLOCUMAB 140 MG/ML
140 INJECTION, SOLUTION SUBCUTANEOUS
Qty: 6 | Refills: 2 | Status: ACTIVE | COMMUNITY
Start: 2025-07-10 | End: 1900-01-01

## 2025-07-15 ENCOUNTER — APPOINTMENT (OUTPATIENT)
Dept: CARDIOLOGY | Facility: CLINIC | Age: 50
End: 2025-07-15
Payer: MEDICAID

## 2025-07-15 VITALS — WEIGHT: 294 LBS | BODY MASS INDEX: 35.79 KG/M2

## 2025-07-15 PROCEDURE — 99213 OFFICE O/P EST LOW 20 MIN: CPT | Mod: 95

## 2025-07-15 PROCEDURE — G2211 COMPLEX E/M VISIT ADD ON: CPT | Mod: NC,95

## 2025-07-30 PROBLEM — E11.9 DIABETES MELLITUS TYPE 2, CONTROLLED: Status: ACTIVE | Noted: 2025-07-10

## 2025-07-30 PROBLEM — E78.01 FAMILIAL HYPERCHOLESTEROLEMIA: Status: ACTIVE | Noted: 2025-07-10

## 2025-08-05 ENCOUNTER — APPOINTMENT (OUTPATIENT)
Dept: CARDIOLOGY | Facility: CLINIC | Age: 50
End: 2025-08-05
Payer: MEDICAID

## 2025-08-05 DIAGNOSIS — E11.9 TYPE 2 DIABETES MELLITUS W/OUT COMPLICATIONS: ICD-10-CM

## 2025-08-05 DIAGNOSIS — E66.01 MORBID (SEVERE) OBESITY DUE TO EXCESS CALORIES: ICD-10-CM

## 2025-08-05 DIAGNOSIS — E78.2 MIXED HYPERLIPIDEMIA: ICD-10-CM

## 2025-08-05 DIAGNOSIS — I25.10 ATHEROSCLEROTIC HEART DISEASE OF NATIVE CORONARY ARTERY W/OUT ANGINA PECTORIS: ICD-10-CM

## 2025-08-05 DIAGNOSIS — E78.01 FAMILIAL HYPERCHOLESTEROLEMIA: ICD-10-CM

## 2025-08-05 PROCEDURE — 99214 OFFICE O/P EST MOD 30 MIN: CPT | Mod: 95

## 2025-08-05 PROCEDURE — G2211 COMPLEX E/M VISIT ADD ON: CPT | Mod: NC,95

## 2025-09-04 ENCOUNTER — APPOINTMENT (OUTPATIENT)
Dept: CARDIOLOGY | Facility: CLINIC | Age: 50
End: 2025-09-04
Payer: MEDICAID

## 2025-09-04 DIAGNOSIS — E11.9 TYPE 2 DIABETES MELLITUS W/OUT COMPLICATIONS: ICD-10-CM

## 2025-09-04 DIAGNOSIS — E66.01 MORBID (SEVERE) OBESITY DUE TO EXCESS CALORIES: ICD-10-CM

## 2025-09-04 DIAGNOSIS — E78.2 MIXED HYPERLIPIDEMIA: ICD-10-CM

## 2025-09-04 DIAGNOSIS — I25.10 ATHEROSCLEROTIC HEART DISEASE OF NATIVE CORONARY ARTERY W/OUT ANGINA PECTORIS: ICD-10-CM

## 2025-09-04 PROCEDURE — G2211 COMPLEX E/M VISIT ADD ON: CPT | Mod: NC,95

## 2025-09-04 PROCEDURE — 99213 OFFICE O/P EST LOW 20 MIN: CPT | Mod: 95
